# Patient Record
Sex: FEMALE | Race: WHITE | NOT HISPANIC OR LATINO | Employment: FULL TIME | URBAN - METROPOLITAN AREA
[De-identification: names, ages, dates, MRNs, and addresses within clinical notes are randomized per-mention and may not be internally consistent; named-entity substitution may affect disease eponyms.]

---

## 2017-01-09 ENCOUNTER — OFFICE VISIT (OUTPATIENT)
Dept: SLEEP MEDICINE | Facility: CLINIC | Age: 57
End: 2017-01-09
Payer: COMMERCIAL

## 2017-01-09 VITALS
BODY MASS INDEX: 21.62 KG/M2 | SYSTOLIC BLOOD PRESSURE: 140 MMHG | DIASTOLIC BLOOD PRESSURE: 80 MMHG | HEIGHT: 66 IN | HEART RATE: 80 BPM | WEIGHT: 134.5 LBS

## 2017-01-09 DIAGNOSIS — F51.01 PRIMARY INSOMNIA: Primary | ICD-10-CM

## 2017-01-09 PROCEDURE — 1159F MED LIST DOCD IN RCRD: CPT | Mod: S$GLB,,, | Performed by: PSYCHIATRY & NEUROLOGY

## 2017-01-09 PROCEDURE — 3079F DIAST BP 80-89 MM HG: CPT | Mod: S$GLB,,, | Performed by: PSYCHIATRY & NEUROLOGY

## 2017-01-09 PROCEDURE — 99999 PR PBB SHADOW E&M-EST. PATIENT-LVL III: CPT | Mod: PBBFAC,,, | Performed by: PSYCHIATRY & NEUROLOGY

## 2017-01-09 PROCEDURE — 99214 OFFICE O/P EST MOD 30 MIN: CPT | Mod: S$GLB,,, | Performed by: PSYCHIATRY & NEUROLOGY

## 2017-01-09 PROCEDURE — 3077F SYST BP >= 140 MM HG: CPT | Mod: S$GLB,,, | Performed by: PSYCHIATRY & NEUROLOGY

## 2017-01-09 RX ORDER — CLONAZEPAM 0.5 MG/1
0.5 TABLET ORAL NIGHTLY
Qty: 30 TABLET | Refills: 5 | Status: SHIPPED | OUTPATIENT
Start: 2017-01-09 | End: 2017-07-13 | Stop reason: SDUPTHER

## 2017-01-09 RX ORDER — TRAZODONE HYDROCHLORIDE 150 MG/1
TABLET ORAL
Qty: 30 TABLET | Refills: 11 | Status: SHIPPED | OUTPATIENT
Start: 2017-01-09 | End: 2017-07-13 | Stop reason: SDUPTHER

## 2017-01-09 NOTE — PROGRESS NOTES
"Dora Vazquez a  56 y.o. female patient returns for the management of JONATHAN and insomnia ongoing.     She is reporting recurrence of night time awakenings at 3 am    Trazodone 150 mg + clonazepam 0.5 mg po before bedtime, chronically - she repots it is the only thing that has worked. She reports she feels rested after taking. She has cut trazodone to 75 mg for about a month, but feels like she should go back up.    Bedtime at 9 pm;   LO at 10 pm  SOL: 15-20 mins (with meds)  OOB: 6:30-7 am    No longer traveling to [facility].    Trying to make CPAP work for mild sleep apnea. Using it since Fall 2014. Sleeps with it a max of 4 hours. Takes it off because of mask. CPAP is helping the snoring, but she is not sure it is helping her sleep quality.    DME = [facility]    ESS = 2    10/13/14  PSG: Most hypopneas occurred at wake-sleep transition and during REM stage sleep. The overall AHI was 9.0 with an oxygen kevin of 90.0%. The supine AHI was 10.8 and the REM AHI was 26.2. The patient did not qualify for a split night study due to an insufficient number of events in the first half of the study.     SLEEP HISTORY:  Chronic insomnia for 5 years, associated with jas-menopausal symptoms (insomnia was primary complaint, but also experienced "cold flashes").  Menopausal symptoms have been controlled with progesterone/testosterone treatments.  Travels to [facility] every 2 weeks (for last 5 months) for business.  Experiencing severe jetlag symptoms following her travels. (Can only sleep for 10 hours for the first 3-4 days following her trips.)  Valium and clonazepam have been helpful with re-acclimating to new time zone.  She reports having tried multiple other sleep-aid medications with limited success: trazodone, vistaril, ambien, ambien CR, intermezzo  When not traveling, feesl like she is lying there for at least an hour, "can't fall asleep due to not being able to turn brain off".    Sacro-illiac disease contributing to back " "pain.  Prescribed valium for this condition, which incidentally helped her jetlag insomnia remarkably well.  She reports prior sleep apnea study was negative about 5 years ago. Weight unchanged  She denies snoring, gasping for air, no shortness of breath in sleep  Denies RLS, Denies leg kicking at night  Denies alcohol use.  Denies mood disturbance, depression, or anxiety: Prior prozac use following the death of her parents.    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 Mood is normal.      ASSESSMENT:    1. Insomnia NEC with underlying tension state. Difficulty turning off mind/pervasive thoughts. She stopped hormone replacement for menopausal symptoms, and insomnia is improved. Still managing tension/anxiety at bedtime with benzo and trazodone. She was doing better until recently reduced her trazodone.    2. Sleep apnea, mild with symptoms of snoring, night sweats, un-refreshing frequently disturbed sleep. She has been trialing APAP with improvement of snoring, but no real improvement of sleep quality.    PLAN:    1. Continue clonazepam 0.5 mg on nightly basis for tension/anxiety by her PCP, on nights not on Valium - taken for past year; Do not combine with alcohol.  2. ContinueTrazodone 150 mg qhs as prescribed  3. Use APAP 6-16cm with [facility] DME as much as possible     Follow up in 6-12 months. MD  "

## 2017-01-09 NOTE — MR AVS SNAPSHOT
[facility]  [address]  [facility] [address]  Phone: [phone]                  Dora Vazquez   2017 9:00 AM   Office Visit    Description:  Female : 1960   Provider:  Ezequiel Florian MD   Department:  [facility]           Reason for Visit     Sleep Apnea           Diagnoses this Visit        Comments    Primary insomnia    -  Primary            To Do List           Future Appointments        Provider Department Dept Phone    7/10/2017 8:40 AM Ezequiel Florian MD [facility] [phone]      Goals (5 Years of Data)     None      Follow-Up and Disposition     Return in about 6 months (around 2017).    Follow-up and Disposition History       These Medications        Disp Refills Start End    trazodone (DESYREL) 150 MG tablet 30 tablet 11 2017     TAKE 1 TABLET (150 MG TOTAL) BY MOUTH NIGHTLY.    Pharmacy: [facility]/pharmacy #[id number] - [place] [address] Ph #: [phone]       clonazePAM (KLONOPIN) 0.5 MG tablet 30 tablet 5 2017     Take 1 tablet (0.5 mg total) by mouth every evening. - Oral    Pharmacy: [facility]/pharmacy #[id number] - [place] [address] #: [phone]         [facility] On Call     [facility] On Call Nurse Care Line -  Assistance  Registered nurses in the [facility] On Call Center provide clinical advisement, health education, appointment booking, and other advisory services.  Call for this free service at [phone].             Medications           Message regarding Medications     Verify the changes and/or additions to your medication regime listed below are the same as discussed with your clinician today.  If any of these changes or additions are incorrect, please notify your healthcare provider.             Verify that the below list of medications is an accurate representation of the medications you are currently taking.  If none reported, the list may be blank. If incorrect, please  "contact your healthcare provider. Carry this list with you in case of emergency.           Current Medications     clonazePAM (KLONOPIN) 0.5 MG tablet Take 1 tablet (0.5 mg total) by mouth every evening.    trazodone (DESYREL) 150 MG tablet TAKE 1 TABLET (150 MG TOTAL) BY MOUTH NIGHTLY.    aspirin (ECOTRIN) 81 MG EC tablet Take 81 mg by mouth once daily.    cyanocobalamin 1,000 mcg/mL injection     CYANOCOBALAMIN, VITAMIN B-12, (VITAMIN B-12 INJ) Inject 1,000 mcg as directed every 30 days.    tramadol (ULTRAM) 50 mg tablet Take 50 mg by mouth every 6 (six) hours as needed.     vitamin D 185 MG Tab Take 185 mg by mouth once daily.           Clinical Reference Information           Vital Signs - Last Recorded  Most recent update: 1/9/2017  8:56 AM by Betzaida Khan    BP Pulse Ht Wt BMI    (!) 140/80 80 5' 6" (1.676 m) 61 kg (134 lb 8 oz) 21.71 kg/m2      Blood Pressure          Most Recent Value    BP  (!)  140/80      Allergies as of 1/9/2017     Codeine      Immunizations Administered on Date of Encounter - 1/9/2017     None      "

## 2017-07-13 ENCOUNTER — OFFICE VISIT (OUTPATIENT)
Dept: SLEEP MEDICINE | Facility: CLINIC | Age: 57
End: 2017-07-13
Payer: COMMERCIAL

## 2017-07-13 VITALS
BODY MASS INDEX: 21.01 KG/M2 | SYSTOLIC BLOOD PRESSURE: 152 MMHG | HEART RATE: 85 BPM | DIASTOLIC BLOOD PRESSURE: 98 MMHG | HEIGHT: 66 IN | WEIGHT: 130.75 LBS

## 2017-07-13 DIAGNOSIS — F51.01 PRIMARY INSOMNIA: ICD-10-CM

## 2017-07-13 DIAGNOSIS — G47.33 OBSTRUCTIVE SLEEP APNEA: Primary | ICD-10-CM

## 2017-07-13 PROCEDURE — 99214 OFFICE O/P EST MOD 30 MIN: CPT | Mod: S$GLB,,, | Performed by: NURSE PRACTITIONER

## 2017-07-13 PROCEDURE — 99999 PR PBB SHADOW E&M-EST. PATIENT-LVL III: CPT | Mod: PBBFAC,,, | Performed by: NURSE PRACTITIONER

## 2017-07-13 RX ORDER — CLONAZEPAM 0.5 MG/1
0.5 TABLET ORAL NIGHTLY
Qty: 30 TABLET | Refills: 5 | Status: SHIPPED | OUTPATIENT
Start: 2017-07-13 | End: 2018-01-03 | Stop reason: SDUPTHER

## 2017-07-13 RX ORDER — TRAZODONE HYDROCHLORIDE 150 MG/1
TABLET ORAL
Qty: 30 TABLET | Refills: 11 | Status: SHIPPED | OUTPATIENT
Start: 2017-07-13 | End: 2018-03-05 | Stop reason: SDUPTHER

## 2017-07-13 NOTE — PROGRESS NOTES
"Dora Vazquez a  56 y.o. female patient returns for the management of JONATHAN and insomnia, last seen by MD 1/9/17, this is my initial visit with her.     Since last seen, she is still not using apap. Never had adherence monitoring after setup '14. Found using it disrupted her sleep more. With taking trazadone 150mg 1/2 tab and klonopin 0.5mg qhs she is able to sleep undisturbed. Denies am hangover. ESS=1. Very high work stress and memory loss issues, affecting work so much that she thinks she might lose her job. Held responsible for things out of her control at times ([employer]). Sleeping 10:30-6am. Feels exhausted and fatigued.       1/9/17  She is reporting recurrence of night time awakenings at 3 am    Trazodone 150 mg + clonazepam 0.5 mg po before bedtime, chronically - she repots it is the only thing that has worked. She reports she feels rested after taking. She has cut trazodone to 75 mg for about a month, but feels like she should go back up.    Bedtime at 9 pm;   LO at 10 pm  SOL: 15-20 mins (with meds)  OOB: 6:30-7 am    No longer traveling to [facility].    Trying to make CPAP work for mild sleep apnea. Using it since Fall 2014. Sleeps with it a max of 4 hours. Takes it off because of mask. CPAP is helping the snoring, but she is not sure it is helping her sleep quality.    DME = [facility]    ESS = 2    10/13/14  PSG: Most hypopneas occurred at wake-sleep transition and during REM stage sleep. The overall AHI was 9.0 with an oxygen kevin of 90.0%. The supine AHI was 10.8 and the REM AHI was 26.2. The patient did not qualify for a split night study due to an insufficient number of events in the first half of the study.     SLEEP HISTORY:  Chronic insomnia for 5 years, associated with jas-menopausal symptoms (insomnia was primary complaint, but also experienced "cold flashes").  Menopausal symptoms have been controlled with progesterone/testosterone treatments.  Travels to [facility] every 2 weeks (for last 5 " "months) for business.  Experiencing severe jetlag symptoms following her travels. (Can only sleep for 10 hours for the first 3-4 days following her trips.)  Valium and clonazepam have been helpful with re-acclimating to new time zone.  She reports having tried multiple other sleep-aid medications with limited success: trazodone, vistaril, ambien, ambien CR, intermezzo  When not traveling, feesl like she is lying there for at least an hour, "can't fall asleep due to not being able to turn brain off".    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  Prescribed valium for this condition, which incidentally helped her jetlag insomnia remarkably well.  She reports prior sleep apnea study was negative about 5 years ago. Weight unchanged  She denies snoring, gasping for air, no shortness of breath in sleep  Denies RLS, Denies leg kicking at night  Denies alcohol use.  Denies mood disturbance, depression, or anxiety: Prior prozac use following the death of her parents.    Past Medical History:   Diagnosis Date    Arthritis     COPD (chronic obstructive pulmonary disease)     Depression     GERD (gastroesophageal reflux disease)     History of colonic polyps     Hypertension     Hypothyroidism     Insomnia     Ulcer        REVIEW OF SYSTEMS: 4# gain, work stress, otherwise a balance review of 10-systems is negative.    PHYSICAL EXAM:  BP (!) 152/98   Pulse 85   Ht 5' 6" (1.676 m)   Wt 59.3 kg (130 lb 11.7 oz)   BMI 21.10 kg/m²   GENERAL: Well groomed; Normally developed;  PSYCH: Affect is normal.  Mood is sad/tearful talking about her memory.      ASSESSMENT:    1. Insomnia NEC with underlying tension state. Difficulty turning off mind/pervasive thoughts. She stopped hormone replacement for menopausal symptoms, and insomnia is improved. Still managing tension/anxiety at bedtime with benzo and trazodone. She was doing better until recently reduced her trazodone. 7/13/17: Sleep onset and maintenance stable on " current dose medications.     2. Sleep apnea, mild, worse REM (AHI 23) .  She has been trialing APAP with improvement of snoring, but no real improvement of sleep quality. 7/13/17:Willing to consider resuming use of APAP 6-16cm with alternative FFM and adherence monitoring (potential factor affecting memory/stress mgt). Perceived memory loss.     PLAN:    1. Continue clonazepam 0.5 mg on nightly basis for tension/anxiety (on nights not on Valium, pcp) Do not combine with alcohol. Refilled  2. ContinueTrazodone 150 mg 1/2 tab qhs as prescribed, refilled  3. Resume APAP 6-16cm with [facility] DME as much as possible, Yan[place] View mask consider, avoid mask leaks. Consider mask liner prn.   4. RTC 7wks (out of town all Aug), adherence

## 2017-08-10 ENCOUNTER — TELEPHONE (OUTPATIENT)
Dept: CARDIOLOGY | Facility: CLINIC | Age: 57
End: 2017-08-10

## 2017-08-10 DIAGNOSIS — R00.2 PALPITATION: Primary | ICD-10-CM

## 2017-08-10 NOTE — TELEPHONE ENCOUNTER
----- Message from Carla Ruggiero sent at 8/10/2017  1:02 PM CDT -----  Contact: pt called  Please add an EKG order. Thank you

## 2017-08-14 ENCOUNTER — OFFICE VISIT (OUTPATIENT)
Dept: CARDIOLOGY | Facility: CLINIC | Age: 57
End: 2017-08-14
Payer: COMMERCIAL

## 2017-08-14 ENCOUNTER — HOSPITAL ENCOUNTER (OUTPATIENT)
Dept: CARDIOLOGY | Facility: CLINIC | Age: 57
Discharge: HOME | End: 2017-08-14
Payer: COMMERCIAL

## 2017-08-14 VITALS
HEIGHT: 65 IN | HEART RATE: 66 BPM | WEIGHT: 129 LBS | SYSTOLIC BLOOD PRESSURE: 140 MMHG | BODY MASS INDEX: 21.49 KG/M2 | DIASTOLIC BLOOD PRESSURE: 78 MMHG

## 2017-08-14 DIAGNOSIS — Z98.84 S/P GASTRIC BYPASS: ICD-10-CM

## 2017-08-14 DIAGNOSIS — R06.02 SOB (SHORTNESS OF BREATH): ICD-10-CM

## 2017-08-14 DIAGNOSIS — D69.6 THROMBOCYTOPENIA: Primary | ICD-10-CM

## 2017-08-14 DIAGNOSIS — R07.89 CHEST PRESSURE: ICD-10-CM

## 2017-08-14 DIAGNOSIS — R00.2 PALPITATION: ICD-10-CM

## 2017-08-14 PROCEDURE — 3008F BODY MASS INDEX DOCD: CPT | Mod: S$GLB,,, | Performed by: INTERNAL MEDICINE

## 2017-08-14 PROCEDURE — 93000 ELECTROCARDIOGRAM COMPLETE: CPT | Mod: S$GLB,,, | Performed by: INTERNAL MEDICINE

## 2017-08-14 PROCEDURE — 3077F SYST BP >= 140 MM HG: CPT | Mod: S$GLB,,, | Performed by: INTERNAL MEDICINE

## 2017-08-14 PROCEDURE — 99999 PR PBB SHADOW E&M-EST. PATIENT-LVL III: CPT | Mod: PBBFAC,,, | Performed by: INTERNAL MEDICINE

## 2017-08-14 PROCEDURE — 3078F DIAST BP <80 MM HG: CPT | Mod: S$GLB,,, | Performed by: INTERNAL MEDICINE

## 2017-08-14 PROCEDURE — 99204 OFFICE O/P NEW MOD 45 MIN: CPT | Mod: S$GLB,,, | Performed by: INTERNAL MEDICINE

## 2017-08-14 RX ORDER — SIMVASTATIN 20 MG/1
20 TABLET, FILM COATED ORAL NIGHTLY
Qty: 90 TABLET | Refills: 3 | Status: SHIPPED | OUTPATIENT
Start: 2017-08-14 | End: 2018-03-15

## 2017-08-14 RX ORDER — AMLODIPINE BESYLATE 10 MG/1
10 TABLET ORAL DAILY
Qty: 45 TABLET | Refills: 3 | Status: SHIPPED | OUTPATIENT
Start: 2017-08-14 | End: 2017-08-21

## 2017-08-14 NOTE — PROGRESS NOTES
"Subjective:   Patient ID:  Dora Vazquez is a 56 y.o. female is a new patient who presents for evaluation of Coronary Artery Disease  HPI:   Patient underwent removal of nodules   Patient is referred for coronary calcification on CT chest.  Ex smoker 50 pack years- patient still smokes occasionally.   Strong f/h   Mother had MI at age 63 when she was 63.   Patient did have an angiogram in 2000 that was negative- for chest pain.   Patient has a lap band and has lost 125 pounds (2007)- in [place].  Patient does not check her BP at home.    EKG: NSR, no ST-T changes.    Patient Active Problem List   Diagnosis    Obesity    Spondylosis without myelopathy    Degeneration of lumbar or lumbosacral intervertebral disc    Spinal stenosis, lumbar region, with neurogenic claudication    Thoracic or lumbosacral neuritis or radiculitis, unspecified    Displacement of lumbar intervertebral disc without myelopathy    Lumbago    Sacroiliitis    GERD (gastroesophageal reflux disease)    Obstructive sleep apnea (adult) (pediatric)     BP (!) 140/78   Pulse 66   Ht 5' 5" (1.651 m)   Wt 58.5 kg (128 lb 15.5 oz)   BMI 21.46 kg/m²   Body mass index is 21.46 kg/m².  CrCl cannot be calculated (Patient's most recent sCr result is older than the maximum 7 days allowed.).    Lab Results   Component Value Date     06/04/2004    K 4.7 06/04/2004     06/04/2004    CO2 27 06/04/2004    BUN 10 06/04/2004    CREATININE 0.7 06/04/2004    GLU 93 06/04/2004    WBC 8.07 06/04/2004    HGB 14.1 06/04/2004    HCT 43.9 06/04/2004    MCV 89.4 06/04/2004     06/04/2004    CHOL 211 (H) 06/04/2004    HDL 53.0 06/04/2004    LDLCALC 129.0 06/04/2004    TRIG 145 06/04/2004       Current Outpatient Prescriptions   Medication Sig    aspirin (ECOTRIN) 81 MG EC tablet Take 81 mg by mouth once daily.    clonazePAM (KLONOPIN) 0.5 MG tablet Take 1 tablet (0.5 mg total) by mouth every evening.    cyanocobalamin 1,000 mcg/mL " injection     CYANOCOBALAMIN, VITAMIN B-12, (VITAMIN B-12 INJ) Inject 1,000 mcg as directed every 30 days.    trazodone (DESYREL) 150 MG tablet TAKE 1 TABLET (150 MG TOTAL) BY MOUTH NIGHTLY.    vitamin D 185 MG Tab Take 185 mg by mouth once daily.    amlodipine (NORVASC) 10 MG tablet Take 1 tablet (10 mg total) by mouth once daily. Take 0.5 pill/day    simvastatin (ZOCOR) 20 MG tablet Take 1 tablet (20 mg total) by mouth every evening.     No current facility-administered medications for this visit.        Review of Systems   Constitution: Positive for malaise/fatigue and weight loss. Negative for chills, decreased appetite, weakness, night sweats and weight gain.   Eyes: Negative for blurred vision, double vision, visual disturbance and visual halos.   Cardiovascular: Negative for chest pain, claudication, cyanosis, dyspnea on exertion, irregular heartbeat, leg swelling, near-syncope, orthopnea, palpitations, paroxysmal nocturnal dyspnea and syncope.   Respiratory: Negative for cough, hemoptysis, snoring, sputum production and wheezing.    Endocrine: Negative for cold intolerance, heat intolerance, polydipsia and polyphagia.   Hematologic/Lymphatic: Negative for adenopathy and bleeding problem. Does not bruise/bleed easily.   Skin: Negative for flushing, itching, poor wound healing and rash.   Musculoskeletal: Negative for arthritis, back pain, falls, gout, joint pain, joint swelling, muscle cramps, muscle weakness, myalgias, neck pain and stiffness.   Gastrointestinal: Negative for bloating, abdominal pain, anorexia, diarrhea, dysphagia, excessive appetite, flatus, hematemesis, jaundice, melena and nausea.   Genitourinary: Negative for hesitancy and incomplete emptying.   Neurological: Negative for aphonia, brief paralysis, difficulty with concentration, disturbances in coordination, excessive daytime sleepiness, dizziness, focal weakness, light-headedness and loss of balance.        Memory loss    Psychiatric/Behavioral: Negative for altered mental status, depression, hallucinations, hypervigilance, memory loss, substance abuse and suicidal ideas. The patient does not have insomnia and is not nervous/anxious.        Objective:   Physical Exam   Constitutional: She is oriented to person, place, and time. She appears well-developed and well-nourished. No distress.   Patient looks well   HENT:   Head: Normocephalic and atraumatic.   Nose: Nose normal.   Mouth/Throat: Oropharynx is clear and moist. No oropharyngeal exudate.   Eyes: Conjunctivae and EOM are normal. Pupils are equal, round, and reactive to light. Right eye exhibits no discharge. Left eye exhibits no discharge. No scleral icterus.   Neck: Normal range of motion. Neck supple. No JVD present. No tracheal deviation present. No thyromegaly present.   Cardiovascular: Normal rate, regular rhythm, normal heart sounds and intact distal pulses.  Exam reveals no gallop and no friction rub.    No murmur heard.  Pulmonary/Chest: Effort normal and breath sounds normal. No stridor. No respiratory distress. She has no wheezes. She has no rales. She exhibits no tenderness.   Abdominal: Soft. Bowel sounds are normal. She exhibits no distension and no mass. There is no tenderness. There is no rebound and no guarding.   Musculoskeletal: Normal range of motion. She exhibits no edema or tenderness.   Lymphadenopathy:     She has no cervical adenopathy.   Neurological: She is alert and oriented to person, place, and time. She has normal reflexes. No cranial nerve deficit. She exhibits normal muscle tone. Coordination normal.   Skin: Skin is warm. No rash noted. She is not diaphoretic. No erythema. No pallor.   Psychiatric: She has a normal mood and affect. Her behavior is normal. Judgment and thought content normal.       Assessment:     1. Thrombocytopenia    2. Chest pressure    3. SOB (shortness of breath)    4. S/P gastric bypass        Plan:   Dora was seen  today for coronary artery disease.    Diagnoses and all orders for this visit:    Thrombocytopenia  -     Ambulatory Referral to Hematology / Oncology    Chest pressure  -     Nuclear Stress Test - Pharmacologic - Interpreted by Cardiology ([facility]); Future    SOB (shortness of breath)  -     Nuclear Stress Test - Pharmacologic - Interpreted by Cardiology ([facility]); Future    S/P gastric bypass    Other orders  -     simvastatin (ZOCOR) 20 MG tablet; Take 1 tablet (20 mg total) by mouth every evening.  -     amlodipine (NORVASC) 10 MG tablet; Take 1 tablet (10 mg total) by mouth once daily. Take 0.5 pill/day      I put in a referral to hematology/oncology due to weight loss and new thrombocytopenia- this is concerning given she has a strong family history of malignancy.   Her sx may be all pulmonary but given her Coronary calcium, smoking history, family history and age, reasonable to pursue a stress test.   Counseled on importance of heart healthy diet low in saturated and trans fat and salt as well gradually starting a regular aerobic exercise regimen with goal of 30min 5x/week. Recommend BP diary. Call if systolic BP > 140 mmHg on checking repeatedly  All meds reviewed and are appropriate  Patient is compliant with her medications.      RTC 5 months for follow up.

## 2017-08-16 ENCOUNTER — TELEPHONE (OUTPATIENT)
Dept: CARDIOLOGY | Facility: CLINIC | Age: 57
End: 2017-08-16

## 2017-08-16 NOTE — TELEPHONE ENCOUNTER
----- Message from Jessie Peres RN sent at 8/14/2017  9:38 AM CDT -----  Dr. Clark staff,    Good morning.  I rec'd a call from [facility] to schedule hematology consult for above listed patient r/t thrombocytopenia.  However, there are no recent labs in the system or under media tab to indicate this.  If she had outside labs could you please fax them to me at [phone] in order to arrange this consult in a timely manner.    Any questions please call [phone].    Thanks,  Jessie Peres, HEIDI

## 2017-08-18 ENCOUNTER — PATIENT MESSAGE (OUTPATIENT)
Dept: CARDIOLOGY | Facility: CLINIC | Age: 57
End: 2017-08-18

## 2017-08-18 ENCOUNTER — TELEPHONE (OUTPATIENT)
Dept: HEMATOLOGY/ONCOLOGY | Facility: CLINIC | Age: 57
End: 2017-08-18

## 2017-08-18 NOTE — TELEPHONE ENCOUNTER
Spoke w/ the pt. explaining records were requested from cards on 08/14 to schedule consult. It is noted pt. has consult w/ Dr. Funes on Thurs, 08/31 at 0900. Pt. unaware of this appt. Will request labs from ; pt. aware if records not rec'd appt. will be rescheduled.

## 2017-08-21 RX ORDER — AMLODIPINE BESYLATE 5 MG/1
5 TABLET ORAL DAILY
Qty: 90 TABLET | Refills: 3 | Status: SHIPPED | OUTPATIENT
Start: 2017-08-21 | End: 2017-08-31

## 2017-08-31 ENCOUNTER — INITIAL CONSULT (OUTPATIENT)
Dept: HEMATOLOGY/ONCOLOGY | Facility: CLINIC | Age: 57
End: 2017-08-31
Payer: COMMERCIAL

## 2017-08-31 ENCOUNTER — LAB VISIT (OUTPATIENT)
Dept: LAB | Facility: HOSPITAL | Age: 57
End: 2017-08-31
Attending: INTERNAL MEDICINE
Payer: COMMERCIAL

## 2017-08-31 VITALS
SYSTOLIC BLOOD PRESSURE: 134 MMHG | BODY MASS INDEX: 20.37 KG/M2 | DIASTOLIC BLOOD PRESSURE: 78 MMHG | WEIGHT: 126.75 LBS | HEART RATE: 76 BPM | HEIGHT: 66 IN

## 2017-08-31 DIAGNOSIS — D69.6 THROMBOCYTOPENIA: ICD-10-CM

## 2017-08-31 LAB
ALBUMIN SERPL BCP-MCNC: 3.9 G/DL
ALP SERPL-CCNC: 81 U/L
ALT SERPL W/O P-5'-P-CCNC: 10 U/L
ANION GAP SERPL CALC-SCNC: 9 MMOL/L
AST SERPL-CCNC: 18 U/L
BASOPHILS # BLD AUTO: 0.1 K/UL
BASOPHILS NFR BLD: 1.8 %
BILIRUB SERPL-MCNC: 0.7 MG/DL
BUN SERPL-MCNC: 13 MG/DL
CALCIUM SERPL-MCNC: 10 MG/DL
CHLORIDE SERPL-SCNC: 102 MMOL/L
CO2 SERPL-SCNC: 30 MMOL/L
CREAT SERPL-MCNC: 0.7 MG/DL
DIFFERENTIAL METHOD: NORMAL
EOSINOPHIL # BLD AUTO: 0.1 K/UL
EOSINOPHIL NFR BLD: 2.3 %
ERYTHROCYTE [DISTWIDTH] IN BLOOD BY AUTOMATED COUNT: 13.1 %
EST. GFR  (AFRICAN AMERICAN): >60 ML/MIN/1.73 M^2
EST. GFR  (NON AFRICAN AMERICAN): >60 ML/MIN/1.73 M^2
GLUCOSE SERPL-MCNC: 93 MG/DL
HCT VFR BLD AUTO: 41.1 %
HGB BLD-MCNC: 13.9 G/DL
HIV 1+2 AB+HIV1 P24 AG SERPL QL IA: NEGATIVE
LYMPHOCYTES # BLD AUTO: 1.7 K/UL
LYMPHOCYTES NFR BLD: 29.4 %
MCH RBC QN AUTO: 29.7 PG
MCHC RBC AUTO-ENTMCNC: 33.8 G/DL
MCV RBC AUTO: 88 FL
MONOCYTES # BLD AUTO: 0.4 K/UL
MONOCYTES NFR BLD: 7.3 %
NEUTROPHILS # BLD AUTO: 3.3 K/UL
NEUTROPHILS NFR BLD: 59 %
PLATELET # BLD AUTO: 163 K/UL
PMV BLD AUTO: 11 FL
POTASSIUM SERPL-SCNC: 3.9 MMOL/L
PROT SERPL-MCNC: 7.2 G/DL
RBC # BLD AUTO: 4.68 M/UL
SODIUM SERPL-SCNC: 141 MMOL/L
TSH SERPL DL<=0.005 MIU/L-ACNC: 1.43 UIU/ML
WBC # BLD AUTO: 5.65 K/UL

## 2017-08-31 PROCEDURE — 86803 HEPATITIS C AB TEST: CPT

## 2017-08-31 PROCEDURE — 85025 COMPLETE CBC W/AUTO DIFF WBC: CPT

## 2017-08-31 PROCEDURE — 86038 ANTINUCLEAR ANTIBODIES: CPT

## 2017-08-31 PROCEDURE — 86677 HELICOBACTER PYLORI ANTIBODY: CPT

## 2017-08-31 PROCEDURE — 99999 PR PBB SHADOW E&M-EST. PATIENT-LVL III: CPT | Mod: PBBFAC,,, | Performed by: INTERNAL MEDICINE

## 2017-08-31 PROCEDURE — 36415 COLL VENOUS BLD VENIPUNCTURE: CPT

## 2017-08-31 PROCEDURE — 86703 HIV-1/HIV-2 1 RESULT ANTBDY: CPT

## 2017-08-31 PROCEDURE — 80053 COMPREHEN METABOLIC PANEL: CPT

## 2017-08-31 PROCEDURE — 83516 IMMUNOASSAY NONANTIBODY: CPT | Mod: 59

## 2017-08-31 PROCEDURE — 99244 OFF/OP CNSLTJ NEW/EST MOD 40: CPT | Mod: S$GLB,,, | Performed by: INTERNAL MEDICINE

## 2017-08-31 PROCEDURE — 84443 ASSAY THYROID STIM HORMONE: CPT

## 2017-08-31 RX ORDER — LANOLIN ALCOHOL/MO/W.PET/CERES
100 CREAM (GRAM) TOPICAL DAILY
COMMUNITY

## 2017-08-31 NOTE — LETTER
August 31, 2017      Velvet Clark MD  [address] [facility]  8th [address]  [place] [address]           [facility]  [address]  [place] [address]  Phone: [phone]          Patient: Dora Vazquez   MR Number: [id number]   YOB: 1960   Date of Visit: 8/31/2017       Dear Dr. Velvet Clark:    Thank you for referring Dora Vazquez to me for evaluation. Attached you will find relevant portions of my assessment and plan of care.    If you have questions, please do not hesitate to call me. I look forward to following Dora Vazquez along with you.    Sincerely,    Jaime Funes Jr., MD    Enclosure  CC:  No Recipients    If you would like to receive this communication electronically, please contact [email or web address] or [phone] to request more information on [employer] Link access.    For providers and/or their staff who would like to refer a patient to [facility], please contact us through our one-stop-shop provider referral line, [facility], at [phone].    If you feel you have received this communication in error or would no longer like to receive these types of communications, please e-mail [email or web address]

## 2017-08-31 NOTE — PROGRESS NOTES
HISTORY OF PRESENT ILLNESS:  Ms. Vazquez is a 56-year-old woman who is seen in   consultation from Dr. Velvet Clark.  She is here in regard to   thrombocytopenia.  She has also lost weight in the last four years.    She has had no spontaneous bleeding except for noting one very transient episode   of blood in the urine or the urethra.    She has no history of peptic ulcer disease.  She has not been told of any   thyroid disease.  She recalls a physician about one year ago telling her that   her platelets were decreased.  She has not had hepatitis nor does she have risk   factors for HIV infection.  She does not report symptoms suggestive of lupus.    She had a Lap-Band procedure in 2007, following which she lost 125 pounds over   eight months.  In 2013,  the Lap-Band was readjusted.  Between October 2013 and   September 2014, her weight declined 40 pounds from 173 to 133 pounds.  Since   September 2014, her weight has been between 127 and 136 pounds.  She reports   that she is eating as much she did prior to the Lap-Band procedure.  She   occasionally has watery diarrhea and this has been present for many years.  She   last had a colonoscopy about 18 months ago.  With prior colonoscopies, polyps   had been removed.    She reports that for the last six months, she has been more fatigued than in the   past.  She is particularly concerned that she is becoming more forgetful.  She   does not think that she is depressed.  She has chronic insomnia.  She is not   having fevers or sweats.  She remains very active at work.    She has mild hypertension and evidence of obstructive lung disease.  In 2013,   she had surgical excision of three benign nodules in her right lung.  This year,   she had two surgical procedures on her left wrist for problems with   tendons in that location.  There was no obvious trauma associated with this   problem, but it was speculated that lifting a heavy object may have damaged a   tendon in  her wrist.  She had several breast biopsies and the lesions   removed have been benign.  She had a cholecystectomy.    SOCIAL HISTORY:  She smoked about one pack of cigarettes daily until 2013 and   she is currently smoking about five cigarettes a day.  She is drinking very   little alcohol.  She is  and has no children.  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  She has traveled extensively out of the [place]   including [place], [place], [place], [place], [place] and [place].    FAMILY HISTORY:  Her father had acute leukemia.  Her paternal grandfather had   some variety of leukemia.  Her mother had a brain tumor.  Her sister had a   low-stage ovarian cancer and apparently has been cured with treatment.  The   sister's cancer was discovered in her 30s.  No genetic studies on the sister or   her family members have been carried out.  A maternal uncle had Hodgkin   lymphoma.    ADDITIONAL PAST HISTORY, SYSTEM REVIEW, SOCIAL HISTORY AND FAMILY HISTORY:  Have   been reviewed and updated in the electronic record.    PHYSICAL EXAMINATION:  GENERAL APPEARANCE:  A well-developed and well-nourished female, in no distress.  HEENT:   Eyes, no jaundice or pallor.  Ears, clear canals and membranes.  Nose,   clear nares.  Sinuses:  No tenderness.  Mouth and throat:  Good dentition.  No   mucosal lesions.  NECK:  No masses or bruits.  No thyroid abnormalities.  LYMPH NODES:  No enlarged cervical, axillary or inguinal nodes.  CHEST AND LUNGS:  Normal respiratory effort.  Clear to auscultation and   percussion.  HEART:  Regular rate and rhythm without murmur or gallop.  ABDOMEN:  Soft without masses or tenderness.  No hepatosplenomegaly.  EXTREMITIES:  No edema or cyanosis.  SKIN:  There are surgical scars on the left wrist.  SKIN:  No suspicious lesions in the areas examined.  A tattoo on the right lower   back.  NEUROLOGIC:  Motor function is good.  She is fully oriented.  Mental status   seems  normal.    LABORATORY STUDIES:  A blood count performed on 07/28/2017, included hemoglobin   13.5, WBC 6400 and platelets are 130,000.  A B12 level was normal at 355.    Comprehensive metabolic profile was normal with the exception of calcium 10.5.    Computed tomography examination of the chest in August 2017 showed mild   emphysematous changes along with some scarring in the right lung and a 2-mm   pleural based nodule in the posterior right upper lobe.    IMPRESSION:  1.  Mild thrombocytopenia.  2.  Tobacco use disorder.  3.  Radiographic evidence of emphysema.  4.  Prior Lap-Band procedure.    RECOMMENDATIONS:  1.  To further evaluate the thrombocytopenia, blood will be obtained today for   the following studies:  CBC with peripheral smear, TYLER, celiac panel, CMP, H.   pylori antibody, hepatitis C antibody, HIV antibody and TSH.  2.  If the studies obtained today do not detect any disorder other than the   modest reduction in platelets, I will advise her to have her CBC monitored at   six-month intervals.  She may have mild immune thrombocytopenia, which is a   diagnosis of exclusion.  As long as the other blood counts are normal,    I do not recommend bone marrow examinations in patients with   thrombocytopenia unless the platelet count falls below 100,000.  3.  I have given her the name of a neurologist whom she may wish to see in   regard to her forgetfulness and I have given her a list of Internal Medicine   specialists since she is interested in seeing a primary care physician at   [facility].    ADDENDUM: The CBC is normal today, including platelets 163,000.      ERIKA  dd: 08/31/2017 09:46:00 (CDT)  td: 08/31/2017 23:37:27 (CDT)  Doc ID   #[id number]  Job ID #[id number]    CC: Velvet Clark M.D.

## 2017-09-01 ENCOUNTER — TELEPHONE (OUTPATIENT)
Dept: HEMATOLOGY/ONCOLOGY | Facility: CLINIC | Age: 57
End: 2017-09-01

## 2017-09-01 LAB
ANA SER QL IF: NORMAL
GLIADIN PEPTIDE IGA SER-ACNC: 7 UNITS
GLIADIN PEPTIDE IGG SER-ACNC: 4 UNITS
HCV AB SERPL QL IA: NEGATIVE
IGA SERPL-MCNC: 88 MG/DL
TTG IGA SER IA-ACNC: 3 UNITS
TTG IGG SER IA-ACNC: 3 UNITS

## 2017-09-01 NOTE — TELEPHONE ENCOUNTER
Platelet count normal, as are most of the other studies I ordered.  She should have her cbc monitored every 6-12 months by her pcp.  She can be referred back to Hematology if a major change occurs.

## 2017-09-05 LAB — H PYLORI IGG SERPL QL IA: NEGATIVE

## 2017-09-07 ENCOUNTER — PATIENT MESSAGE (OUTPATIENT)
Dept: CARDIOLOGY | Facility: CLINIC | Age: 57
End: 2017-09-07

## 2017-09-08 ENCOUNTER — TELEPHONE (OUTPATIENT)
Dept: CARDIOLOGY | Facility: CLINIC | Age: 57
End: 2017-09-08

## 2017-09-08 RX ORDER — HYDROCHLOROTHIAZIDE 25 MG/1
25 TABLET ORAL DAILY
Qty: 90 TABLET | Refills: 3 | Status: SHIPPED | OUTPATIENT
Start: 2017-09-08 | End: 2018-09-17 | Stop reason: SDUPTHER

## 2017-09-15 ENCOUNTER — CLINICAL SUPPORT (OUTPATIENT)
Dept: CARDIOLOGY | Facility: CLINIC | Age: 57
End: 2017-09-15
Payer: COMMERCIAL

## 2017-09-15 DIAGNOSIS — R06.02 SOB (SHORTNESS OF BREATH): ICD-10-CM

## 2017-09-15 DIAGNOSIS — R07.89 CHEST PRESSURE: ICD-10-CM

## 2017-09-15 PROCEDURE — 78452 HT MUSCLE IMAGE SPECT MULT: CPT | Mod: S$GLB,,, | Performed by: INTERNAL MEDICINE

## 2017-09-15 PROCEDURE — A9502 TC99M TETROFOSMIN: HCPCS | Mod: S$GLB,,, | Performed by: INTERNAL MEDICINE

## 2017-09-15 PROCEDURE — 93015 CV STRESS TEST SUPVJ I&R: CPT | Mod: S$GLB,,, | Performed by: INTERNAL MEDICINE

## 2017-09-18 ENCOUNTER — PATIENT MESSAGE (OUTPATIENT)
Dept: CARDIOLOGY | Facility: CLINIC | Age: 57
End: 2017-09-18

## 2017-09-18 ENCOUNTER — TELEPHONE (OUTPATIENT)
Dept: CARDIOLOGY | Facility: CLINIC | Age: 57
End: 2017-09-18

## 2017-09-18 NOTE — TELEPHONE ENCOUNTER
----- Message from Carla Ruggiero sent at 9/18/2017 11:00 AM CDT -----  Contact: pt called  Pt called, states she is returning your call and will like to be reached at ph [phone].She states she is boarding a flight and will like you to leave a message width details. Pt of Dr. Clark and LOV 8/14/17. Thank you

## 2017-09-18 NOTE — TELEPHONE ENCOUNTER
----- Message from Velvet Clark MD sent at 9/17/2017  9:53 PM CDT -----  Please let pt. Know that the stress test was normal

## 2017-11-29 ENCOUNTER — TELEPHONE (OUTPATIENT)
Dept: NEUROLOGY | Facility: CLINIC | Age: 57
End: 2017-11-29

## 2017-12-10 ENCOUNTER — OFFICE VISIT (OUTPATIENT)
Dept: URGENT CARE | Facility: CLINIC | Age: 57
End: 2017-12-10
Payer: COMMERCIAL

## 2017-12-10 VITALS
HEIGHT: 65 IN | HEART RATE: 92 BPM | OXYGEN SATURATION: 98 % | RESPIRATION RATE: 16 BRPM | SYSTOLIC BLOOD PRESSURE: 153 MMHG | TEMPERATURE: 99 F | WEIGHT: 125 LBS | DIASTOLIC BLOOD PRESSURE: 94 MMHG | BODY MASS INDEX: 20.83 KG/M2

## 2017-12-10 DIAGNOSIS — J40 BRONCHITIS: Primary | ICD-10-CM

## 2017-12-10 PROCEDURE — 96372 THER/PROPH/DIAG INJ SC/IM: CPT | Mod: S$GLB,,, | Performed by: FAMILY MEDICINE

## 2017-12-10 PROCEDURE — 99214 OFFICE O/P EST MOD 30 MIN: CPT | Mod: 25,S$GLB,, | Performed by: FAMILY MEDICINE

## 2017-12-10 RX ORDER — AMOXICILLIN AND CLAVULANATE POTASSIUM 600; 42.9 MG/5ML; MG/5ML
POWDER, FOR SUSPENSION ORAL
Qty: 140 ML | Refills: 0 | Status: SHIPPED | OUTPATIENT
Start: 2017-12-10 | End: 2017-12-20

## 2017-12-10 RX ORDER — ALBUTEROL SULFATE 90 UG/1
2 AEROSOL, METERED RESPIRATORY (INHALATION) EVERY 4 HOURS PRN
Qty: 1 INHALER | Refills: 0 | Status: SHIPPED | OUTPATIENT
Start: 2017-12-10 | End: 2020-08-28

## 2017-12-10 RX ORDER — BENZONATATE 200 MG/1
200 CAPSULE ORAL 3 TIMES DAILY PRN
Qty: 30 CAPSULE | Refills: 0 | Status: SHIPPED | OUTPATIENT
Start: 2017-12-10 | End: 2017-12-20

## 2017-12-10 RX ORDER — BETAMETHASONE SODIUM PHOSPHATE AND BETAMETHASONE ACETATE 3; 3 MG/ML; MG/ML
9 INJECTION, SUSPENSION INTRA-ARTICULAR; INTRALESIONAL; INTRAMUSCULAR; SOFT TISSUE
Status: COMPLETED | OUTPATIENT
Start: 2017-12-10 | End: 2017-12-10

## 2017-12-10 RX ADMIN — BETAMETHASONE SODIUM PHOSPHATE AND BETAMETHASONE ACETATE 9 MG: 3; 3 INJECTION, SUSPENSION INTRA-ARTICULAR; INTRALESIONAL; INTRAMUSCULAR; SOFT TISSUE at 06:12

## 2017-12-11 NOTE — PROGRESS NOTES
"Subjective:       Patient ID: Dora Vazquez is a 57 y.o. female.    Vitals:  height is 5' 5" (1.651 m) and weight is 56.7 kg (125 lb). Her temperature is 98.6 °F (37 °C). Her blood pressure is 153/94 (abnormal) and her pulse is 92. Her respiration is 16 and oxygen saturation is 98%.     Chief Complaint: URI (pt said she has been sick for a week)    Pt said she took one bactrim yesterday and one tonight      URI    This is a new problem. The current episode started in the past 7 days. The problem has been unchanged. There has been no fever. Associated symptoms include congestion, coughing and headaches. Pertinent negatives include no abdominal pain, chest pain, ear pain, nausea, sore throat or wheezing. She has tried decongestant and NSAIDs for the symptoms. The treatment provided no relief.     Review of Systems   Constitution: Positive for malaise/fatigue. Negative for chills and fever.   HENT: Positive for congestion. Negative for ear pain, hoarse voice and sore throat.    Eyes: Negative for discharge and redness.   Cardiovascular: Negative for chest pain, dyspnea on exertion and leg swelling.   Respiratory: Positive for cough. Negative for shortness of breath, sputum production and wheezing.    Musculoskeletal: Negative for myalgias.   Gastrointestinal: Negative for abdominal pain and nausea.   Neurological: Positive for headaches.       Objective:      Physical Exam   Constitutional: She is oriented to person, place, and time. She appears well-developed and well-nourished.   HENT:   Head: Normocephalic and atraumatic.   Right Ear: External ear normal.   Left Ear: External ear normal.   Eyes: EOM are normal. Pupils are equal, round, and reactive to light.   Neck: Normal range of motion. Neck supple. No JVD present. No tracheal deviation present. No thyromegaly present.   Cardiovascular: Normal rate, regular rhythm and normal heart sounds.  Exam reveals no gallop and no friction rub.    No murmur " heard.  Pulmonary/Chest: Breath sounds normal. No respiratory distress. She has no wheezes. She has no rales. She exhibits no tenderness.   Abdominal: Soft. Bowel sounds are normal. She exhibits no distension and no mass. There is no tenderness. There is no rebound and no guarding. No hernia.   Musculoskeletal: Normal range of motion. She exhibits no edema, tenderness or deformity.   Lymphadenopathy:     She has no cervical adenopathy.   Neurological: She is alert and oriented to person, place, and time. She displays normal reflexes. No cranial nerve deficit. She exhibits normal muscle tone. Coordination normal.   Skin: Skin is warm. Capillary refill takes less than 2 seconds. No rash noted. No erythema. No pallor.   Psychiatric: She has a normal mood and affect. Her behavior is normal. Judgment and thought content normal.   Vitals reviewed.      Assessment:       1. Bronchitis        Plan:         Bronchitis  -     amoxicillin-clavulanate (AUGMENTIN) 600-42.9 mg/5 mL SusR; 7 ml po twice a day for 10 days  Dispense: 140 mL; Refill: 0  -     benzonatate (TESSALON) 200 MG capsule; Take 1 capsule (200 mg total) by mouth 3 (three) times daily as needed for Cough.  Dispense: 30 capsule; Refill: 0  -     albuterol 90 mcg/actuation inhaler; Inhale 2 puffs into the lungs every 4 (four) hours as needed for Wheezing. Rescue  Dispense: 1 Inhaler; Refill: 0  -     betamethasone acetate-betamethasone sodium phosphate injection 9 mg; Inject 1.5 mLs (9 mg total) into the muscle one time.      Follow up with your doctor in a few days as needed.  Return to the urgent care or go to the ER if symptoms get worse.    Benjamín Mendoza MD

## 2017-12-11 NOTE — PATIENT INSTRUCTIONS
What Is Acute Bronchitis?  Acute bronchitis is when the airways in your lungs (bronchial tubes) become red and swollen (inflamed). It is usually caused by a viral infection. But it can also occur because of a bacteria or allergen. Symptoms include a cough that produces yellow or greenish mucus and can last for days or sometimes weeks.  Inside healthy lungs    Air travels in and out of the lungs through the airways. The linings of these airways produce sticky mucus. This mucus traps particles that enter the lungs. Tiny structures called cilia then sweep the particles out of the airways.     Healthy airway: Airways are normally open. Air moves in and out easily.      Healthy cilia: Tiny, hairlike cilia sweep mucus and particles up and out of the airways.   Lungs with bronchitis  Bronchitis often occurs with a cold or the flu virus. The airways become inflamed (red and swollen). There is a deep hacking cough from the extra mucus. Other symptoms may include:  · Wheezing  · Chest discomfort  · Shortness of breath  · Mild fever  A second infection, this time due to bacteria, may then occur. And airways irritated by allergens or smoke are more likely to get infected.        Inflamed airway: Inflammation and extra mucus narrow the airway, causing shortness of breath.      Impaired cilia: Extra mucus impairs cilia, causing congestion and wheezing. Smoking makes the problem worse.   Making a diagnosis  A physical exam, health history, and certain tests help your healthcare provider make the diagnosis.  Health history  Your healthcare provider will ask you about your symptoms.  The exam  Your provider listens to your chest for signs of congestion. He or she may also check your ears, nose, and throat.  Possible tests  · A sputum test for bacteria. This requires a sample of mucus from your lungs.  · A nasal or throat swab. This tests to see if you have a bacterial infection.  · A chest X-ray. This is done if your healthcare  provider thinks you have pneumonia.  · Tests to check for an underlying condition. Other tests may be done to check for things such as allergies, asthma, or COPD (chronic obstructive pulmonary disease). You may need to see a specialist for more lung function testing.  Treating a cough  The main treatment for bronchitis is easing symptoms. Avoiding smoke, allergens, and other things that trigger coughing can often help. If the infection is bacterial, you may be given antibiotics. During the illness, it's important to get plenty of sleep. To ease symptoms:  · Dont smoke. Also avoid secondhand smoke.  · Use a humidifier. Or try breathing in steam from a hot shower. This may help loosen mucus.  · Drink a lot of water and juice. They can soothe the throat and may help thin mucus.  · Sit up or use extra pillows when in bed. This helps to lessen coughing and congestion.  · Ask your provider about using medicine. Ask about using cough medicine, pain and fever medicine, or a decongestant.  Antibiotics  Most cases of bronchitis are caused by cold or flu viruses. They dont need antibiotics to treat them, even if your mucus is thick and green or yellow. Antibiotics dont treat viral illness and antibiotics have not been shown to have any benefit in cases of acute bronchitis. Taking antibiotics when they are not needed increases your risk of getting an infection later that is antibiotic-resistant. Antibiotics can also cause severe cases of diarrhea that require other antibiotics to treat.  It is important that you accept your healthcare provider's opinion to not use antibiotics. Your provider will prescribe antibiotics if the infection is caused by bacteria. If they are prescribed:  · Take all of the medicine. Take the medicine until it is used up, even if symptoms have improved. If you dont, the bronchitis may come back.  · Take the medicines as directed. For instance, some medicines should be taken with food.  · Ask about  side effects. Ask your provider or pharmacist what side effects are common, and what to do about them.  Follow-up care  You should see your provider again in 2 to 3 weeks. By this time, symptoms should have improved. An infection that lasts longer may mean you have a more serious problem.  Prevention  · Avoid tobacco smoke. If you smoke, quit. Stay away from smoky places. Ask friends and family not to smoke around you, or in your home or car.  · Get checked for allergies.  · Ask your provider about getting a yearly flu shot. Also ask about pneumococcal or pneumonia shots.  · Wash your hands often. This helps reduce the chance of picking up viruses that cause colds and flu.  Call your healthcare provider if:  · Symptoms worsen, or you have new symptoms  · Breathing problems worsen or  become severe  · Symptoms dont get better within a week, or within 3 days of taking antibiotics   Date Last Reviewed: 2/1/2017  © 5929-1133 [employer]. [address] [place] [address]. All rights reserved. This information is not intended as a substitute for professional medical care. Always follow your healthcare professional's instructions.      Follow up with your doctor in a few days as needed.  Return to the urgent care or go to the ER if symptoms get worse.    Benjamín Mendoza MD

## 2018-01-03 DIAGNOSIS — F51.01 PRIMARY INSOMNIA: ICD-10-CM

## 2018-01-03 RX ORDER — CLONAZEPAM 0.5 MG/1
0.5 TABLET ORAL NIGHTLY
Qty: 30 TABLET | Refills: 0 | Status: SHIPPED | OUTPATIENT
Start: 2018-01-03 | End: 2018-01-05 | Stop reason: SDUPTHER

## 2018-01-03 NOTE — TELEPHONE ENCOUNTER
----- Message from Brianne Beach sent at 1/2/2018  3:04 PM CST -----  Contact: LEONELA MONTOYA [[id number]]  x_  1st Request  _  2nd Request  _  3rd Request        Who: LEONELA MONTOYA [[id number]]    Why: Requesting a call back in regards to getting an appointment and the patient needs to be seen before the 12th of this month. Please call her.     What Number to Call Back: [phone]    When to Expect a call back: (Within 24 hours)    Please return the call at earliest convenience. Thanks!

## 2018-01-03 NOTE — TELEPHONE ENCOUNTER
----- Message from Brianne Beach sent at 1/2/2018  3:09 PM CST -----  Contact: LEONELA MONTOYA [[id number]]      x_  1st Request  _  2nd Request  _  3rd Request    Please refill the medication(s) listed below. Please call the patient when the prescription(s) is ready for  at this phone number             Medication #1  clonazePAM (KLONOPIN) 0.5 MG tablet 30 tablet     Medication #2      Preferred Pharmacy:

## 2018-01-05 ENCOUNTER — OFFICE VISIT (OUTPATIENT)
Dept: SLEEP MEDICINE | Facility: CLINIC | Age: 58
End: 2018-01-05
Payer: COMMERCIAL

## 2018-01-05 VITALS
SYSTOLIC BLOOD PRESSURE: 109 MMHG | HEIGHT: 65 IN | WEIGHT: 129 LBS | HEART RATE: 70 BPM | BODY MASS INDEX: 21.49 KG/M2 | DIASTOLIC BLOOD PRESSURE: 68 MMHG

## 2018-01-05 DIAGNOSIS — F51.01 PRIMARY INSOMNIA: ICD-10-CM

## 2018-01-05 PROCEDURE — 99213 OFFICE O/P EST LOW 20 MIN: CPT | Mod: S$GLB,,, | Performed by: NURSE PRACTITIONER

## 2018-01-05 PROCEDURE — 99999 PR PBB SHADOW E&M-EST. PATIENT-LVL III: CPT | Mod: PBBFAC,,, | Performed by: NURSE PRACTITIONER

## 2018-01-05 RX ORDER — CLONAZEPAM 0.5 MG/1
0.5 TABLET ORAL NIGHTLY
Qty: 30 TABLET | Refills: 0 | Status: SHIPPED | OUTPATIENT
Start: 2018-03-23 | End: 2018-03-27 | Stop reason: SDUPTHER

## 2018-01-05 RX ORDER — CLONAZEPAM 0.5 MG/1
0.5 TABLET ORAL NIGHTLY
Qty: 30 TABLET | Refills: 0 | Status: SHIPPED | OUTPATIENT
Start: 2018-02-23 | End: 2018-01-05 | Stop reason: SDUPTHER

## 2018-01-05 RX ORDER — CLONAZEPAM 0.5 MG/1
0.5 TABLET ORAL NIGHTLY
Qty: 30 TABLET | Refills: 0 | Status: SHIPPED | OUTPATIENT
Start: 2018-03-23 | End: 2018-01-05 | Stop reason: SDUPTHER

## 2018-01-05 RX ORDER — CLONAZEPAM 0.5 MG/1
0.5 TABLET ORAL NIGHTLY
Qty: 30 TABLET | Refills: 0 | Status: SHIPPED | OUTPATIENT
Start: 2018-01-23 | End: 2018-01-05 | Stop reason: SDUPTHER

## 2018-01-05 NOTE — PROGRESS NOTES
Dora Porter Taylor seen in follow-up for primary insomnia. Last seen in clinic by RAYNA Pearl NP 07/13/2017. This is her initial visit with me.     INTERVAL HISTORY:    01/05/2018 VIVIANA Christensen NP: Pt made today's appointment due to difficulty refilling clonazepam when she is out of town. Was recently out of town and ran out of clonazepam taken for primary insomnia and was told by out-of-town pharmacy that she can not complete interstate transfer of controlled medications. Here today to obtain hard copy Rx for clonazepam since she has future travel plans that will time next refill while she is not in home pharmacy. Reports that she is able to have continuous sleep with clonazepam and absolutely can not sleep without it, especially when travelling. 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  She has decreased trazodone to 75 mg from 150 mg. Denies wearing off effects in the mornings.     Of note, pt did not bring CPAP machine today since she was coming in for medication refill for insomnia. But, admits she does not use it nightly, in part due to busy travel schedule.     07/13/2017 RAYNA Saucedo NP Dora Charlotte Vazquez a  57 y.o. female patient returns for the management of JONATHAN and insomnia, last seen by MD 1/9/17, this is my initial visit with her.   Since last seen, she is still not using apap. Never had adherence monitoring after setup '14. Found using it disrupted her sleep more. With taking trazadone 150mg 1/2 tab and klonopin 0.5mg qhs she is able to sleep undisturbed. Denies am hangover. ESS=1. Very high work stress and memory loss issues, affecting work so much that she thinks she might lose her job. Held responsible for things out of her control at times ([employer]). Sleeping 10:30-6am. Feels exhausted and fatigued.       1/9/17 Dr. Florian  She is reporting recurrence of night time awakenings at 3 am    Trazodone 150 mg + clonazepam 0.5 mg po  "before bedtime, chronically - she repots it is the only thing that has worked. She reports she feels rested after taking. She has cut trazodone to 75 mg for about a month, but feels like she should go back up.    Bedtime at 9 pm;   LO at 10 pm  SOL: 15-20 mins (with meds)  OOB: 6:30-7 am    No longer traveling to [facility].    Trying to make CPAP work for mild sleep apnea. Using it since Fall 2014. Sleeps with it a max of 4 hours. Takes it off because of mask. CPAP is helping the snoring, but she is not sure it is helping her sleep quality.    DME = [facility]    ESS = 2    10/13/14  PSG: Most hypopneas occurred at wake-sleep transition and during REM stage sleep. The overall AHI was 9.0 with an oxygen kevin of 90.0%. The supine AHI was 10.8 and the REM AHI was 26.2. The patient did not qualify for a split night study due to an insufficient number of events in the first half of the study.     SLEEP HISTORY:  Chronic insomnia for 5 years, associated with jas-menopausal symptoms (insomnia was primary complaint, but also experienced "cold flashes").  Menopausal symptoms have been controlled with progesterone/testosterone treatments.  Travels to [facility] every 2 weeks (for last 5 months) for business.  Experiencing severe jetlag symptoms following her travels. (Can only sleep for 10 hours for the first 3-4 days following her trips.)  Valium and clonazepam have been helpful with re-acclimating to new time zone.  She reports having tried multiple other sleep-aid medications with limited success: trazodone, vistaril, ambien, ambien CR, intermezzo  When not traveling, feesl like she is lying there for at least an hour, "can't fall asleep due to not being able to turn brain off".    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  Prescribed valium for this condition, which incidentally helped her jetlag insomnia remarkably well.  She reports prior sleep apnea study was negative about 5 years ago. Weight unchanged  She denies " "snoring, gasping for air, no shortness of breath in sleep  Denies RLS, Denies leg kicking at night  Denies alcohol use.  Denies mood disturbance, depression, or anxiety: Prior prozac use following the death of her parents.    Past Medical History:   Diagnosis Date    Arthritis     COPD (chronic obstructive pulmonary disease)     Depression     GERD (gastroesophageal reflux disease)     History of colonic polyps     Hypertension     Hypothyroidism     Insomnia     Ulcer        REVIEW OF SYSTEMS: 4# gain, work stress, otherwise a balance review of 10-systems is negative.    PHYSICAL EXAM:  /68   Pulse 70   Ht 5' 5" (1.651 m)   Wt 58.5 kg (129 lb)   BMI 21.47 kg/m²   GENERAL: Well groomed; Normally developed;  PSYCH: Affect is normal.     ASSESSMENT:    1. Insomnia NEC with underlying tension state. Difficulty turning off mind/pervasive thoughts. She stopped hormone replacement for menopausal symptoms, and insomnia is improved. Still managing tension/anxiety at bedtime with benzo and trazodone. Sleep onset and maintenance stable on current dose medications.     PLAN:    1. Continue clonazepam 0.5 mg on nightly basis for tension/anxiety. Do not combine with alcohol. Provided sequential 3 hardcopy Rxs start dates: 01/23, 2/23, 3/23/2018  2. ContinueTrazodone 75 mg 1 tab qhs  3. Encourage nightly use of CPAP for JONATHAN and explicit discussion regarding relationship between hypnotic sedative rxs and untreated JONATHAN; pt unable to schedule recommended adherence f/u for CPAP today as she does not know upcoming schedule since she is starting new job end of January; pt to call to make 6-8 week f/u with Dr. Florian only per pt preference.     "

## 2018-03-05 ENCOUNTER — OFFICE VISIT (OUTPATIENT)
Dept: SLEEP MEDICINE | Facility: CLINIC | Age: 58
End: 2018-03-05
Payer: COMMERCIAL

## 2018-03-05 VITALS
HEART RATE: 67 BPM | HEIGHT: 65 IN | DIASTOLIC BLOOD PRESSURE: 76 MMHG | SYSTOLIC BLOOD PRESSURE: 115 MMHG | WEIGHT: 129 LBS | BODY MASS INDEX: 21.49 KG/M2

## 2018-03-05 DIAGNOSIS — G47.33 OBSTRUCTIVE SLEEP APNEA (ADULT) (PEDIATRIC): Primary | ICD-10-CM

## 2018-03-05 DIAGNOSIS — F51.01 PRIMARY INSOMNIA: ICD-10-CM

## 2018-03-05 PROCEDURE — 3078F DIAST BP <80 MM HG: CPT | Mod: S$GLB,,, | Performed by: NURSE PRACTITIONER

## 2018-03-05 PROCEDURE — 3074F SYST BP LT 130 MM HG: CPT | Mod: S$GLB,,, | Performed by: NURSE PRACTITIONER

## 2018-03-05 PROCEDURE — 99214 OFFICE O/P EST MOD 30 MIN: CPT | Mod: SA,S$GLB,, | Performed by: NURSE PRACTITIONER

## 2018-03-05 PROCEDURE — 99999 PR PBB SHADOW E&M-EST. PATIENT-LVL III: CPT | Mod: PBBFAC,,, | Performed by: NURSE PRACTITIONER

## 2018-03-05 RX ORDER — TRAZODONE HYDROCHLORIDE 150 MG/1
TABLET ORAL
Qty: 30 TABLET | Refills: 11 | Status: SHIPPED | OUTPATIENT
Start: 2018-03-05 | End: 2018-07-19 | Stop reason: SDUPTHER

## 2018-03-05 NOTE — PROGRESS NOTES
Dora Vazquez seen in follow-up for JONATHAN management and CPAP equipment check.     03/05/2018 Checked-in 17 minutes late for 20 min appt.     INTERVAL HISTORY:    03/05/2018 VIVIANA Christensen NP:  Pt thought scheduled appt with Dr. Florian was for today when it was actually scheduled for 03/15/2018. Pt is moving to [place] and wanted to get prescription updated for clonazepam, trazodone, and get new Rx for travel CPAP machine. Did not bring CPAP machine again today. Admits that CPAP use is very little, citing regular travel as main reason. Reports that current CPAP machine is too bulky for travel and she believes travel CPAP machine would result in better use. Trazodone and clonazepam controls anxiety and helps induce sleep without untoward side effects. Admits that despite medications she often has interrupted sleep waking up around 2 am and 5 am. ESS 11    CPAP Interrogation: did not bring machine    01/05/2018 VIVIANA Christensen NP: Pt made today's appointment due to difficulty refilling clonazepam when she is out of town. Was recently out of town and ran out of clonazepam taken for primary insomnia and was told by out-of-town pharmacy that she can not complete interstate transfer of controlled medications. Here today to obtain hard copy Rx for clonazepam since she has future travel plans that will time next refill while she is not in home pharmacy. Reports that she is able to have continuous sleep with clonazepam and absolutely can not sleep without it, especially when travelling. 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  She has decreased trazodone to 75 mg from 150 mg. Denies wearing off effects in the mornings.     Of note, pt did not bring CPAP machine today since she was coming in for medication refill for insomnia. But, admits she does not use it nightly, in part due to busy travel schedule.     07/13/2017 RAYNA Vazquez a  57 y.o.  "female patient returns for the management of JONATHAN and insomnia, last seen by MD 1/9/17, this is my initial visit with her.   Since last seen, she is still not using apap. Never had adherence monitoring after setup '14. Found using it disrupted her sleep more. With taking trazadone 150mg 1/2 tab and klonopin 0.5mg qhs she is able to sleep undisturbed. Denies am hangover. ESS=1. Very high work stress and memory loss issues, affecting work so much that she thinks she might lose her job. Held responsible for things out of her control at times ([employer]). Sleeping 10:30-6am. Feels exhausted and fatigued.       1/9/17 Dr. Florian  She is reporting recurrence of night time awakenings at 3 am    Trazodone 150 mg + clonazepam 0.5 mg po before bedtime, chronically - she repots it is the only thing that has worked. She reports she feels rested after taking. She has cut trazodone to 75 mg for about a month, but feels like she should go back up.    Bedtime at 9 pm;   LO at 10 pm  SOL: 15-20 mins (with meds)  OOB: 6:30-7 am    No longer traveling to [facility].    Trying to make CPAP work for mild sleep apnea. Using it since Fall 2014. Sleeps with it a max of 4 hours. Takes it off because of mask. CPAP is helping the snoring, but she is not sure it is helping her sleep quality.    DME = [facility]    ESS = 2    10/13/14  PSG: Most hypopneas occurred at wake-sleep transition and during REM stage sleep. The overall AHI was 9.0 with an oxygen kevin of 90.0%. The supine AHI was 10.8 and the REM AHI was 26.2. The patient did not qualify for a split night study due to an insufficient number of events in the first half of the study.     SLEEP HISTORY:  Chronic insomnia for 5 years, associated with jas-menopausal symptoms (insomnia was primary complaint, but also experienced "cold flashes").  Menopausal symptoms have been controlled with progesterone/testosterone treatments.  Travels to [facility] every 2 weeks (for last 5 months) for " "business.  Experiencing severe jetlag symptoms following her travels. (Can only sleep for 10 hours for the first 3-4 days following her trips.)  Valium and clonazepam have been helpful with re-acclimating to new time zone.  She reports having tried multiple other sleep-aid medications with limited success: trazodone, vistaril, ambien, ambien CR, intermezzo  When not traveling, feesl like she is lying there for at least an hour, "can't fall asleep due to not being able to turn brain off".    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  Prescribed valium for this condition, which incidentally helped her jetlag insomnia remarkably well.  She reports prior sleep apnea study was negative about 5 years ago. Weight unchanged  She denies snoring, gasping for air, no shortness of breath in sleep  Denies RLS, Denies leg kicking at night  Denies alcohol use.  Denies mood disturbance, depression, or anxiety: Prior prozac use following the death of her parents.    Past Medical History:   Diagnosis Date    Arthritis     COPD (chronic obstructive pulmonary disease)     Depression     GERD (gastroesophageal reflux disease)     History of colonic polyps     Hypertension     Hypothyroidism     Insomnia     Ulcer        REVIEW OF SYSTEMS: 4# gain, work stress, otherwise a balance review of 10-systems is negative.    PHYSICAL EXAM:  /76   Pulse 67   Ht 5' 5" (1.651 m)   Wt 58.5 kg (129 lb)   BMI 21.47 kg/m²   GENERAL: Well groomed; Normally developed;  PSYCH: Affect is normal.     ASSESSMENT:    JONATHAN, mild by AHI. The patient symptomatically has interrupted sleep, unrefreshing sleep and daytime fatigue. Medical co-morbidities: depression, GERD, and hypothyroidism. This warrants treatment.     Insomnia NEC with underlying tension state. Difficulty turning off mind/pervasive thoughts. She stopped hormone replacement for menopausal symptoms, and insomnia is improved. Still managing tension/anxiety at bedtime with " benzo and trazodone. Sleep onset and maintenance stable on current dose medications.     PLAN:     JONATHAN:  -new rx for travel machine. Discussed potential insurance resistance since last machine issued < 5 years, per pt she has different insurance; also discussed that travel machine may not be covered by insurance. Pt will await [facility] call regarding cost analysis  -encouraged nightly CPAP use, at least 4 hours a night, ultimately goal hourly use  -RTC 31 - 90 days after PAP      Insomnia:  -Continue clonazepam 0.5 mg on nightly basis for tension/anxiety. Do not combine with alcohol. Pt has not filled two hard copies provided during 01/05/2018 visit - 2/23, 3/23/2018  -ContinueTrazodone 75 mg 1 tab qhs. Refilled.   -Encourage nightly use of CPAP for JONATHAN and explicit discussion regarding relationship between hypnotic sedative rxs and untreated JONATHAN    Education: During our discussion today, we talked about the etiology of obstructive sleep apnea as well as the potential ramifications of untreated sleep apnea, which could include daytime sleepiness, hypertension, heart disease and/or stroke. We discussed potential treatment options, which could include weight loss, body positioning, continuous positive airway pressure (CPAP), or referral for surgical consideration.     Behavior modification which includes losing weight, exercising, changing the sleep position, abstaining from alcohol, and avoiding certain medications    Precautions: The patient was advised to abstain from driving should they feel sleepy or drowsy

## 2018-03-15 ENCOUNTER — OFFICE VISIT (OUTPATIENT)
Dept: URGENT CARE | Facility: CLINIC | Age: 58
End: 2018-03-15
Payer: COMMERCIAL

## 2018-03-15 VITALS
HEIGHT: 66 IN | HEART RATE: 102 BPM | TEMPERATURE: 99 F | DIASTOLIC BLOOD PRESSURE: 93 MMHG | SYSTOLIC BLOOD PRESSURE: 142 MMHG | BODY MASS INDEX: 20.89 KG/M2 | WEIGHT: 130 LBS | OXYGEN SATURATION: 100 % | RESPIRATION RATE: 18 BRPM

## 2018-03-15 DIAGNOSIS — H66.92 LEFT OTITIS MEDIA, UNSPECIFIED OTITIS MEDIA TYPE: ICD-10-CM

## 2018-03-15 DIAGNOSIS — J02.8 ACUTE PHARYNGITIS DUE TO OTHER SPECIFIED ORGANISMS: Primary | ICD-10-CM

## 2018-03-15 PROCEDURE — 99213 OFFICE O/P EST LOW 20 MIN: CPT | Mod: 25,S$GLB,, | Performed by: INTERNAL MEDICINE

## 2018-03-15 PROCEDURE — 3080F DIAST BP >= 90 MM HG: CPT | Mod: CPTII,S$GLB,, | Performed by: INTERNAL MEDICINE

## 2018-03-15 PROCEDURE — 3077F SYST BP >= 140 MM HG: CPT | Mod: CPTII,S$GLB,, | Performed by: INTERNAL MEDICINE

## 2018-03-15 PROCEDURE — 96372 THER/PROPH/DIAG INJ SC/IM: CPT | Mod: S$GLB,,, | Performed by: INTERNAL MEDICINE

## 2018-03-15 RX ORDER — BETAMETHASONE SODIUM PHOSPHATE AND BETAMETHASONE ACETATE 3; 3 MG/ML; MG/ML
9 INJECTION, SUSPENSION INTRA-ARTICULAR; INTRALESIONAL; INTRAMUSCULAR; SOFT TISSUE ONCE
Status: COMPLETED | OUTPATIENT
Start: 2018-03-15 | End: 2018-03-15

## 2018-03-15 RX ORDER — HYDROCODONE BITARTRATE AND ACETAMINOPHEN 7.5; 325 MG/1; MG/1
1 TABLET ORAL EVERY 6 HOURS PRN
Qty: 20 TABLET | Refills: 0 | Status: SHIPPED | OUTPATIENT
Start: 2018-03-15 | End: 2020-04-24

## 2018-03-15 RX ORDER — AMOXICILLIN AND CLAVULANATE POTASSIUM 875; 125 MG/1; MG/1
1 TABLET, FILM COATED ORAL EVERY 12 HOURS
Qty: 20 TABLET | Refills: 0 | Status: SHIPPED | OUTPATIENT
Start: 2018-03-15 | End: 2018-03-25

## 2018-03-15 RX ADMIN — BETAMETHASONE SODIUM PHOSPHATE AND BETAMETHASONE ACETATE 9 MG: 3; 3 INJECTION, SUSPENSION INTRA-ARTICULAR; INTRALESIONAL; INTRAMUSCULAR; SOFT TISSUE at 06:03

## 2018-03-15 NOTE — PROGRESS NOTES
"Subjective:       Patient ID: Dora Vazquez is a 57 y.o. female.    Vitals:  height is 5' 5.5" (1.664 m) and weight is 59 kg (130 lb). Her temperature is 98.6 °F (37 °C). Her blood pressure is 142/93 (abnormal) and her pulse is 102. Her respiration is 18 and oxygen saturation is 100%.     Chief Complaint: Sore Throat    Sore Throat    This is a new problem. The current episode started today. The problem has been gradually worsening. There has been no fever. The pain is mild. Associated symptoms include coughing, ear pain and trouble swallowing. Pertinent negatives include no abdominal pain, congestion, headaches, hoarse voice or shortness of breath. She has tried acetaminophen for the symptoms. The treatment provided no relief.     Review of Systems   Constitution: Positive for malaise/fatigue. Negative for chills and fever.   HENT: Positive for ear pain, sore throat and trouble swallowing. Negative for congestion and hoarse voice.    Eyes: Negative for discharge and redness.   Cardiovascular: Negative for chest pain, dyspnea on exertion and leg swelling.   Respiratory: Positive for cough. Negative for shortness of breath, sputum production and wheezing.    Musculoskeletal: Negative for myalgias.   Gastrointestinal: Negative for abdominal pain and nausea.   Neurological: Negative for headaches.       Objective:      Physical Exam   Constitutional: She appears well-developed and well-nourished.   HENT:   Head: Normocephalic and atraumatic.   Right Ear: Hearing, tympanic membrane, external ear and ear canal normal.   Left Ear: Tympanic membrane is injected and erythematous. A middle ear effusion is present. Decreased hearing is noted.   Mouth/Throat: Oropharyngeal exudate, posterior oropharyngeal edema and posterior oropharyngeal erythema present.   Eyes: Conjunctivae and EOM are normal. Pupils are equal, round, and reactive to light.   Neck: Normal range of motion. Neck supple.   Nursing note and vitals " reviewed.      Assessment:       1. Acute pharyngitis due to other specified organisms    2. Left otitis media, unspecified otitis media type        Plan:         Acute pharyngitis due to other specified organisms  -     betamethasone acetate-betamethasone sodium phosphate injection 9 mg; Inject 1.5 mLs (9 mg total) into the muscle once.  -     amoxicillin-clavulanate 875-125mg (AUGMENTIN) 875-125 mg per tablet; Take 1 tablet by mouth every 12 (twelve) hours.  Dispense: 20 tablet; Refill: 0    Left otitis media, unspecified otitis media type  -     betamethasone acetate-betamethasone sodium phosphate injection 9 mg; Inject 1.5 mLs (9 mg total) into the muscle once.  -     hydrocodone-acetaminophen 7.5-325mg (NORCO) 7.5-325 mg per tablet; Take 1 tablet by mouth every 6 (six) hours as needed for Pain.  Dispense: 20 tablet; Refill: 0  -     amoxicillin-clavulanate 875-125mg (AUGMENTIN) 875-125 mg per tablet; Take 1 tablet by mouth every 12 (twelve) hours.  Dispense: 20 tablet; Refill: 0

## 2018-03-27 ENCOUNTER — TELEPHONE (OUTPATIENT)
Dept: SLEEP MEDICINE | Facility: CLINIC | Age: 58
End: 2018-03-27

## 2018-03-27 DIAGNOSIS — F51.01 PRIMARY INSOMNIA: ICD-10-CM

## 2018-03-27 RX ORDER — CLONAZEPAM 0.5 MG/1
0.5 TABLET ORAL NIGHTLY
Qty: 30 TABLET | Refills: 0 | Status: SHIPPED | OUTPATIENT
Start: 2018-03-27 | End: 2018-04-06 | Stop reason: SDUPTHER

## 2018-03-27 NOTE — TELEPHONE ENCOUNTER
Left vm to let patient know to let them know that their prescription has been sent to the pharmacy and filled.

## 2018-03-27 NOTE — TELEPHONE ENCOUNTER
verified. Clonazepam 0.5 mg last filled 02/22/2018.     30-day supply refilled and e-scribed to [facility] # [id number] per pt request.

## 2018-03-27 NOTE — TELEPHONE ENCOUNTER
----- Message from Raudel Martin sent at 3/27/2018 12:01 PM CDT -----  Contact: [facility]  x_ 1st Request   _ 2nd Request   _ 3rd Request     Who: LEONELA MONTOYA [[id number]]    Why: pharmacy is requesting refill on Rx clonazePAM (KLONOPIN) 0.5 MG tablet be sent to [facility]/pharmacy #[id number] - [place], [place] - [address] [phone]    What Number to Call Back: [phone]    When to Expect a call back: (Before the end of the day)   -- if call after 3:00 call back will be tomorrow.

## 2018-03-27 NOTE — TELEPHONE ENCOUNTER
----- Message from Kellee Luque sent at 3/27/2018  2:04 PM CDT -----  Contact: pt  _  1st Request  _  2nd Request  _  3rd Request        Who: pt    Why: Requesting a call back in regards to returned the nurse's phone call. Please call pt   Please return the call at earliest convenience. Thanks!    What Number to Call Back:[phone]      When to Expect a call back: (Within 24 hours)

## 2018-04-06 DIAGNOSIS — F51.01 PRIMARY INSOMNIA: ICD-10-CM

## 2018-04-06 NOTE — TELEPHONE ENCOUNTER
----- Message from Kellee Luque sent at 4/6/2018 10:18 AM CDT -----  Contact: caridad Hatfield the clinic reply in [facility]: no      Please refill the medication(s) listed below. Please call the patient when the prescription(s) is ready for  at this phone number   [phone]        Medication #1 clonazePAM (KLONOPIN) 0.5 MG tablet -    Medication #2       [facility]: [facility] [address]

## 2018-04-09 RX ORDER — CLONAZEPAM 0.5 MG/1
0.5 TABLET ORAL NIGHTLY
Qty: 30 TABLET | Refills: 2 | Status: SHIPPED | OUTPATIENT
Start: 2018-04-09 | End: 2018-05-18 | Stop reason: SDUPTHER

## 2018-05-18 DIAGNOSIS — F51.01 PRIMARY INSOMNIA: ICD-10-CM

## 2018-05-18 NOTE — TELEPHONE ENCOUNTER
Patient has moved to [place] and she plans on still using you which she doesn't mind flying/driving to see you. She wants to know if you can do 6 months supply of the Klonopin and she'll come in for a appt in 5 months and get another refill. She did NOT get the RX from the [facility] location they wouldn't transfer it over to [place].    Rufus Smith MA

## 2018-05-22 RX ORDER — CLONAZEPAM 0.5 MG/1
0.5 TABLET ORAL NIGHTLY
Qty: 30 TABLET | Refills: 5 | Status: SHIPPED | OUTPATIENT
Start: 2018-05-22 | End: 2018-06-21

## 2018-07-19 DIAGNOSIS — F51.01 PRIMARY INSOMNIA: ICD-10-CM

## 2018-07-19 RX ORDER — TRAZODONE HYDROCHLORIDE 150 MG/1
TABLET ORAL
Qty: 30 TABLET | Refills: 2 | Status: SHIPPED | OUTPATIENT
Start: 2018-07-19

## 2018-08-08 ENCOUNTER — TELEPHONE (OUTPATIENT)
Dept: SLEEP MEDICINE | Facility: CLINIC | Age: 58
End: 2018-08-08

## 2018-08-08 DIAGNOSIS — G47.33 OBSTRUCTIVE SLEEP APNEA: Primary | ICD-10-CM

## 2018-08-08 NOTE — TELEPHONE ENCOUNTER
----- Message from Charly Kimbrough sent at 8/8/2018 10:14 AM CDT -----  Regarding: Cpap Supplies  Contact: [phone]  Patient has contacted me regarding new cpap supplies and uses a [facility] but her Rx is for a Nasal. Please update Rx so I can send it out to Mrs Vazquez. Thanks!

## 2018-09-17 RX ORDER — HYDROCHLOROTHIAZIDE 25 MG/1
25 TABLET ORAL DAILY
Qty: 90 TABLET | Refills: 0 | Status: SHIPPED | OUTPATIENT
Start: 2018-09-17 | End: 2020-04-24

## 2020-01-19 ENCOUNTER — HOSPITAL ENCOUNTER (EMERGENCY)
Facility: HOSPITAL | Age: 60
Discharge: HOME | End: 2020-01-19
Attending: EMERGENCY MEDICINE
Payer: COMMERCIAL

## 2020-01-19 ENCOUNTER — NURSE TRIAGE (OUTPATIENT)
Dept: ADMINISTRATIVE | Facility: CLINIC | Age: 60
End: 2020-01-19

## 2020-01-19 VITALS
HEART RATE: 76 BPM | TEMPERATURE: 98 F | SYSTOLIC BLOOD PRESSURE: 146 MMHG | WEIGHT: 126 LBS | HEIGHT: 65 IN | BODY MASS INDEX: 20.99 KG/M2 | RESPIRATION RATE: 18 BRPM | DIASTOLIC BLOOD PRESSURE: 83 MMHG | OXYGEN SATURATION: 99 %

## 2020-01-19 DIAGNOSIS — H57.11 EYE PAIN, RIGHT: Primary | ICD-10-CM

## 2020-01-19 DIAGNOSIS — H20.9 IRITIS: ICD-10-CM

## 2020-01-19 LAB
ANION GAP SERPL CALC-SCNC: 6 MMOL/L (ref 8–16)
BASOPHILS # BLD AUTO: 0.12 K/UL (ref 0–0.2)
BASOPHILS NFR BLD: 1.2 % (ref 0–1.9)
BUN SERPL-MCNC: 12 MG/DL (ref 6–20)
CALCIUM SERPL-MCNC: 10 MG/DL (ref 8.7–10.5)
CHLORIDE SERPL-SCNC: 104 MMOL/L (ref 95–110)
CO2 SERPL-SCNC: 31 MMOL/L (ref 23–29)
CREAT SERPL-MCNC: 0.7 MG/DL (ref 0.5–1.4)
CRP SERPL-MCNC: 0.8 MG/L (ref 0–8.2)
DIFFERENTIAL METHOD: ABNORMAL
EOSINOPHIL # BLD AUTO: 0.2 K/UL (ref 0–0.5)
EOSINOPHIL NFR BLD: 1.6 % (ref 0–8)
ERYTHROCYTE [DISTWIDTH] IN BLOOD BY AUTOMATED COUNT: 12.6 % (ref 11.5–14.5)
ERYTHROCYTE [SEDIMENTATION RATE] IN BLOOD BY WESTERGREN METHOD: 6 MM/HR (ref 0–36)
EST. GFR  (AFRICAN AMERICAN): >60 ML/MIN/1.73 M^2
EST. GFR  (NON AFRICAN AMERICAN): >60 ML/MIN/1.73 M^2
GLUCOSE SERPL-MCNC: 117 MG/DL (ref 70–110)
HCT VFR BLD AUTO: 42 % (ref 37–48.5)
HGB BLD-MCNC: 13.7 G/DL (ref 12–16)
IMM GRANULOCYTES # BLD AUTO: 0.06 K/UL (ref 0–0.04)
IMM GRANULOCYTES NFR BLD AUTO: 0.6 % (ref 0–0.5)
LYMPHOCYTES # BLD AUTO: 3.5 K/UL (ref 1–4.8)
LYMPHOCYTES NFR BLD: 36.3 % (ref 18–48)
MCH RBC QN AUTO: 29.7 PG (ref 27–31)
MCHC RBC AUTO-ENTMCNC: 32.6 G/DL (ref 32–36)
MCV RBC AUTO: 91 FL (ref 82–98)
MONOCYTES # BLD AUTO: 0.5 K/UL (ref 0.3–1)
MONOCYTES NFR BLD: 5.5 % (ref 4–15)
NEUTROPHILS # BLD AUTO: 5.3 K/UL (ref 1.8–7.7)
NEUTROPHILS NFR BLD: 54.8 % (ref 38–73)
NRBC BLD-RTO: 0 /100 WBC
PLATELET # BLD AUTO: 211 K/UL (ref 150–350)
PMV BLD AUTO: 11.6 FL (ref 9.2–12.9)
POTASSIUM SERPL-SCNC: 4.2 MMOL/L (ref 3.5–5.1)
RBC # BLD AUTO: 4.62 M/UL (ref 4–5.4)
SODIUM SERPL-SCNC: 141 MMOL/L (ref 136–145)
WBC # BLD AUTO: 9.63 K/UL (ref 3.9–12.7)

## 2020-01-19 PROCEDURE — 99284 EMERGENCY DEPT VISIT MOD MDM: CPT | Mod: 25

## 2020-01-19 PROCEDURE — 25000003 PHARM REV CODE 250: Performed by: EMERGENCY MEDICINE

## 2020-01-19 PROCEDURE — 85652 RBC SED RATE AUTOMATED: CPT

## 2020-01-19 PROCEDURE — 80048 BASIC METABOLIC PNL TOTAL CA: CPT

## 2020-01-19 PROCEDURE — 85025 COMPLETE CBC W/AUTO DIFF WBC: CPT

## 2020-01-19 PROCEDURE — 96374 THER/PROPH/DIAG INJ IV PUSH: CPT

## 2020-01-19 PROCEDURE — 99284 EMERGENCY DEPT VISIT MOD MDM: CPT | Mod: ,,, | Performed by: EMERGENCY MEDICINE

## 2020-01-19 PROCEDURE — 99284 PR EMERGENCY DEPT VISIT,LEVEL IV: ICD-10-PCS | Mod: ,,, | Performed by: EMERGENCY MEDICINE

## 2020-01-19 PROCEDURE — 86140 C-REACTIVE PROTEIN: CPT

## 2020-01-19 PROCEDURE — 63600175 PHARM REV CODE 636 W HCPCS: Performed by: EMERGENCY MEDICINE

## 2020-01-19 RX ORDER — METOCLOPRAMIDE HYDROCHLORIDE 5 MG/ML
10 INJECTION INTRAMUSCULAR; INTRAVENOUS
Status: COMPLETED | OUTPATIENT
Start: 2020-01-19 | End: 2020-01-19

## 2020-01-19 RX ORDER — PREDNISOLONE ACETATE 10 MG/ML
1 SUSPENSION/ DROPS OPHTHALMIC 4 TIMES DAILY
Qty: 10 ML | Refills: 1 | Status: SHIPPED | OUTPATIENT
Start: 2020-01-19 | End: 2020-01-19 | Stop reason: SDUPTHER

## 2020-01-19 RX ORDER — ACETAMINOPHEN 500 MG
1000 TABLET ORAL
Status: COMPLETED | OUTPATIENT
Start: 2020-01-19 | End: 2020-01-19

## 2020-01-19 RX ORDER — BRIMONIDINE TARTRATE 2 MG/ML
1 SOLUTION/ DROPS OPHTHALMIC 3 TIMES DAILY
Qty: 5 ML | Refills: 2 | Status: SHIPPED | OUTPATIENT
Start: 2020-01-19 | End: 2020-01-19 | Stop reason: SDUPTHER

## 2020-01-19 RX ORDER — PREDNISOLONE ACETATE 10 MG/ML
1 SUSPENSION/ DROPS OPHTHALMIC 4 TIMES DAILY
Qty: 10 ML | Refills: 1 | Status: SHIPPED | OUTPATIENT
Start: 2020-01-19 | End: 2020-04-24 | Stop reason: SDUPTHER

## 2020-01-19 RX ORDER — IBUPROFEN 400 MG/1
400 TABLET ORAL
Status: COMPLETED | OUTPATIENT
Start: 2020-01-19 | End: 2020-01-19

## 2020-01-19 RX ORDER — BRIMONIDINE TARTRATE 2 MG/ML
1 SOLUTION/ DROPS OPHTHALMIC 3 TIMES DAILY
Qty: 5 ML | Refills: 2 | Status: SHIPPED | OUTPATIENT
Start: 2020-01-19 | End: 2020-08-28 | Stop reason: SDUPTHER

## 2020-01-19 RX ORDER — TETRACAINE HYDROCHLORIDE 5 MG/ML
2 SOLUTION OPHTHALMIC
Status: COMPLETED | OUTPATIENT
Start: 2020-01-19 | End: 2020-01-19

## 2020-01-19 RX ADMIN — IBUPROFEN 400 MG: 400 TABLET, FILM COATED ORAL at 04:01

## 2020-01-19 RX ADMIN — TETRACAINE HYDROCHLORIDE 2 DROP: 5 SOLUTION OPHTHALMIC at 03:01

## 2020-01-19 RX ADMIN — METOCLOPRAMIDE 10 MG: 5 INJECTION, SOLUTION INTRAMUSCULAR; INTRAVENOUS at 05:01

## 2020-01-19 RX ADMIN — ACETAMINOPHEN 1000 MG: 500 TABLET ORAL at 02:01

## 2020-01-19 NOTE — ED PROVIDER NOTES
"Encounter Date: 1/19/2020    SCRIBE #1 NOTE: I, Edwin Fountain, am scribing for, and in the presence of,  Dr. Brunson. I have scribed the entire note.       History     Chief Complaint   Patient presents with    Headache     Pt reports headaches and "feeling of something floating in eyes".  Pt s/p biateral eye surgery recently     HPI     Time patient was seen by the provider: 2:33 PM      The patient is a 59 y.o. female with co-morbidities including: COPD, GERD, Depression, and HTN, who presents to the ED with a complaint of Headache. The patient states that she recently had a surgery for glaucoma and cataracts in each eye, before she moved here from [place]. Patient has been having a strong headache which she says feels like stabbing and is ranked at about an 8/10. She has had severe pain on the right eye. She says the left eye does not have the same floaty feeling that the right eye does. Patient reports she is not currently taking any blood pressure medications. Patient currently had Bronchitis and is still on antibiotic regimen.       Review of patient's allergies indicates:   Allergen Reactions    Codeine Nausea Only     Past Medical History:   Diagnosis Date    Arthritis     COPD (chronic obstructive pulmonary disease)     Depression     GERD (gastroesophageal reflux disease)     History of colonic polyps     Hypertension     Hypothyroidism     Insomnia     Ulcer      Past Surgical History:   Procedure Laterality Date    BILATERAL VATS ABLATION  8/27/13    Dr. Parisi at  (benign)    CHOLECYSTECTOMY      COSMETIC SURGERY      FRACTURE SURGERY      LAPAROSCOPIC GASTRIC BANDING  12/27/07    APS lap band    ligation thumb repair       Family History   Problem Relation Age of Onset    Heart disease Mother     Cancer Father      Social History     Tobacco Use    Smoking status: Former Smoker     Packs/day: 0.50     Years: 30.00     Pack years: 15.00     Last attempt to quit: 8/10/2013     Years " since quittin.4    Smokeless tobacco: Never Used   Substance Use Topics    Alcohol use: No    Drug use: No     Review of Systems  Constitutional:  No Fever, No Chills,   Eyes: No Vision Changes. Floaty feeling in her left eye and eye pain.   ENT/Mouth: No sore throat, No rhinorrhea  Cardiovascular:  No Chest Pain, No Palpitations  Respiratory:  No Cough, No SOB  Gastrointestinal:  No Nausea, No Vomiting, No Diarrhea, No abdo pain.  Genitourinary:  No  pain, No dysuria   Musculoskeletal:  No Arthralgias, No Back Pain, No Neck Pain, No recent trauma.  Skin:  No skin Lesions  Neuro:  No Weakness, No Numbness, No Paresthesias, No Dizziness, Headache    Physical Exam     Initial Vitals [20 1419]   BP Pulse Resp Temp SpO2   (!) 183/97 81 16 98.3 °F (36.8 °C) 99 %      MAP       --         Physical Exam  Physical Exam:  GENERAL APPEARANCE: Well developed, well nourished, in no acute distress.  HENT: Normocephalic, atraumatic    EYES: Sclerae anicteric. PERRL. EOMI. No pink conjunctival injection. No foreign bodies seen in or around the eye. 20/25 on the R, 20/20 on the L.   NECK: Supple, no thyroid enlargement  CARDIOVASCULAR: Regular rate and rhythm without any murmurs, gallops, rubs.  LUNGS: Speaking in full sentences. Breathing comfortably. Auscultation of the lungs revealed normal breath sounds b/l  ABDOMEN: Soft and nontender, no masses, no rebound or guarding   NEUROLOGIC: Alert, interacting normally. No facial droop.   MSK: Moving all four extremities.  Skin: Warm and dry. No visible rash on exposed areas of skin.    Psych: Mood and affect normal.   Neuro: Finger to nose intact bilat and 5/5 S&S bilterally. Cranial nerves normal bilaterally.    ED Course   Procedures  Labs Reviewed - No data to display       Imaging Results    None          Medical Decision Making:   History:   Old Medical Records: I decided to obtain old medical records.  Initial Assessment:   Cataracts and Glaucoma surgery in late  December. Bilateral eyes now with severe pain behind her eye. Pain similar to pain before surgery. no focal Neuro deficits. No trauma. At this time this is no consistent with dangerous intercranial pathology. Will treat with tylenol and consult opthalmology for evaluation for possible recurrent glaucoma, stent displacement, or other dangerous eye pathology.  Clinical Tests:   Lab Tests: Reviewed and Ordered  ED Management:  Update:  Eye pressure on the right was 16 and on the left was 14 with me. Will give Motrin and continue to wait for Optho evaluation.     Update:   Labs benign with markers low.  Ophtho thinks pt pain 2/2 to iritis, sig improvement with ED meds.   She will follow with them promptly  Strict ED return instructions.     Findings of ED work up explained to patient. Patient agrees with discharge plan and verbalizes understanding of return precautions.       Other:   I have discussed this case with another health care provider.       <> Summary of the Discussion:   Discussed with Opthalmology. They will come see patient but have about an hour delay. Will check pressures now to ensure that they are not dangerously elevated.   See MDM            Scribe Attestation:   Scribe #1: I performed the above scribed service and the documentation accurately describes the services I performed. I attest to the accuracy of the note.    Attending Attestation:           Physician Attestation for Scribe:  Physician Attestation Statement for Scribe #1: I, Destin Brunson MD, reviewed documentation, as scribed by Edwin Fountain in my presence, and it is both accurate and complete.                               Clinical Impression:   No diagnosis found.                          Destin Brunson MD  01/20/20 [id number]

## 2020-01-19 NOTE — TELEPHONE ENCOUNTER
Had eye stent sx for glaucoma on 12/27/19 and 12/30/19-- taking pressure drops. Just moved here yesterday and was lifting and possibly violated activity rules. Headache on side of temple. And eye is fluttering. And severe eye pain.     Eye pressure prior to sx was 42.       . instructed pt to go to ED now. Friend will drive per pt report.         Reason for Disposition   Severe pain in one eye    Additional Information   Negative: Difficult to awaken or acting confused (e.g., disoriented, slurred speech)   Negative: Weakness of the face, arm or leg on one side of the body and new onset   Negative: Numbness of the face, arm or leg on one side of the body and new onset   Negative: Loss of speech or garbled speech and new onset   Negative: Passed out (i.e., fainted, collapsed and was not responding)   Negative: Sounds like a life-threatening emergency to the triager   Negative: Unable to walk without falling   Negative: SEVERE headache, sudden onset (i.e., reaching maximum intensity within 30 seconds)   Negative: SEVERE headache, states 'worst headache' of life   Negative: Possibility of carbon monoxide exposure   Negative: Stiff neck (can't touch chin to chest)    Protocols used: HEADACHE-A-OH

## 2020-01-19 NOTE — ED NOTES
LOC: The patient is awake, alert and aware of environment with an appropriate affect, the patient is oriented x 3 and speaking appropriately.  APPEARANCE: Patient resting comfortably and in no acute distress, patient is clean and well groomed, patient's clothing is properly fastened.  SKIN: The skin is warm and dry, color consistent with ethnicity, patient has normal skin turgor and moist mucus membranes, skin intact, no breakdown or bruising noted.  MUSCULOSKELETAL: Patient moving all extremities spontaneously, no obvious swelling or deformities noted.  RESPIRATORY: Airway is open and patent, respirations are spontaneous, patient has a normal effort and rate, no accessory muscle use noted.

## 2020-01-19 NOTE — ED TRIAGE NOTES
"Eye stent surgery on dec 27th and Dec 30.  Pt with right temporal headache that intially started yesterday , increased today.  Pt states that she "feels something" in her right eye.  Pt states vision has decreased over the past couple of days.  "

## 2020-01-19 NOTE — CONSULTS
Consultation Report  Ophthalmology Service    Date: 01/19/2020    Chief complaint/Reason for Consult: right side eye pain, recent glaucoma, and cataract surgery     History of Present Illness: Dora Vazquez is a 59 y.o. female who presents with right temporal headache with OD foreign body sensation.    Patient reported mild right sided headache since Friday (3 days ago) that had interval worsening today (10/10). She just moved from [place] to [place] today and had been moving stuff into her new house. Upon ED presentation, symptoms had interval improvement with Acetaminophen, Ibuprofen, and Metoclopramide. No OD discomfort off topical anesthetic.    She was diagnosed in [place] to have acute angle closure glaucoma OD. This resulted in prompt surgery OU.    S/p CEIOL w/ iStent, OD 12/27/2019 and OS 12/30/2019. Currently on Lumigan QHS and Pred Forte QD OU.    She reported some OD glare while driving at night. Patient currently denies any visual changes or disturbances, flashes or floaters, or ocular discomfort OU. Patient denies temporal tenderness and jaw claudication both sides.    Patient is concerned about acute angle closure attack OD.    POcularHx: as above, no other eye issues    Current eye gtts: Lumigan QHS OU, PredForte QD OU    PMHx:  has a past medical history of Arthritis, COPD (chronic obstructive pulmonary disease), Depression, GERD (gastroesophageal reflux disease), History of colonic polyps, Hypertension, Hypothyroidism, Insomnia, and Ulcer.     PSurgHx:  has a past surgical history that includes Cholecystectomy; Laparoscopic gastric banding (12/27/07); ligation thumb repair; Bilateral VATS ablation (8/27/13); Cosmetic surgery; and Fracture surgery.     Home Medications:   Prior to Admission medications    Medication Sig Start Date End Date Taking? Authorizing Provider   albuterol 90 mcg/actuation inhaler Inhale 2 puffs into the lungs every 4 (four) hours as needed for Wheezing. Rescue 12/10/17  1/9/18  Benjamín Mendoza MD   clonazePAM (KLONOPIN) 0.5 MG tablet Take 1 tablet (0.5 mg total) by mouth every evening. 5/22/18 6/21/18  Ezequiel Florian MD   cyanocobalamin (VITAMIN B-12) 1000 MCG tablet Take 100 mcg by mouth once daily.    Historical Provider, MD   hydroCHLOROthiazide (HYDRODIURIL) 25 MG tablet TAKE 1 TABLET (25 MG TOTAL) BY MOUTH ONCE DAILY. 9/17/18 9/17/19  Velvet Clark MD   hydrocodone-acetaminophen 7.5-325mg (NORCO) 7.5-325 mg per tablet Take 1 tablet by mouth every 6 (six) hours as needed for Pain. 3/15/18   Papi Jiang MD   traZODone (DESYREL) 150 MG tablet TAKE 1 TABLET (150 MG TOTAL) BY MOUTH NIGHTLY. 7/19/18   Sima Pearl NP   vitamin D 185 MG Tab Take 185 mg by mouth once daily.    Historical Provider, MD        Medications this encounter:     Allergies: is allergic to codeine.     Social:  reports that she quit smoking about 6 years ago. She has a 15.00 pack-year smoking history. She has never used smokeless tobacco. She reports that she does not drink alcohol or use drugs.     Family Hx: No family history of glaucoma, macular degeneration, or blindness per patient. family history includes Cancer in her father; Heart disease in her mother.     ROS: As per HPI    Ocular examination/Dilated fundus examination:  Base Eye Exam     Visual Acuity       Right Left    Near cc 20/20 20/20          Tonometry (Tonopen, 5:54 PM)       Right Left    Pressure 15 17          Gonioscopy       Right Left    Temporal TM TM    Nasal TM TM    Superior TM TM    Inferior TM TM   Unable to visualize iStents OU, attempted x3           Pupils       Pupils Dark Light Shape React APD    Right PERRL 4 2 Round Brisk None    Left PERRL 4 2 Round Brisk None          Visual Fields       Right Left     Full Full          Extraocular Movement       Right Left     Full, Ortho Full, Ortho            Additional Tests     Color       Right Left    Ishihara 16/16 16/16            Slit Lamp and Fundus Exam      External Exam       Right Left    External temporal artery pulse palpable, no tenderness on palpation temporal artery pulse palpable, no tenderness on palpation          Slit Lamp Exam       Right Left    Lids/Lashes Normal Normal    Conjunctiva/Sclera White and quiet White and quiet    Cornea clear corneal incisions 7:00 and 12:00, PEEs inferiorly clear corneal incisions 12:00 and 5:00, PEEs inferiorly    Anterior Chamber Trace Flare, Trace Cell Deep and quiet    Iris Round and reactive Round and reactive    Lens Posterior chamber intraocular lens, Posterior capsular opacification Posterior chamber intraocular lens    Vitreous Vitreous syneresis Vitreous syneresis          Fundus Exam       Right Left    Disc Normal Normal    C/D Ratio 0.2 0.15    Macula Normal Normal    Vessels Normal Normal    Periphery yellow pigmentation in shape of almond at 6:00 Normal                Assessment/Plan:   1. Rebound Iritis, OD, s/p CEIOL and iStent OD  - No concern for GCA given normal ESR, CRP, and CBC, normal external exam, no jaw claudication, and grossly normal fundus exam  - Start over the counter pain medications as needed  - Discontinue Lumigan right eye  - Start Pred Forte 4 times a day right eye  - Start Brimonidine 3 times a day right eye  - Continue Pred Forte once a day left eye eye  - Continue Lumigan once at night left eye  - General return precautions provided  - Patient to follow up with Glaucoma within 1 week, Ophthalmology will arrange, Patient's best phone number: [phone]    Destin Hernandez MD PGY-2  Ophthalmology Resident  01/19/2020  5:53 PM

## 2020-01-20 NOTE — DISCHARGE INSTRUCTIONS
Instructions from ophthalmology:  - Discontinue Lumigan right eye  - Start Pred Forte 4 times a day right eye  - Start Brimonidine 3 times a day right eye  - Continue Pred Forte once a day left eye eye  - Continue Lumigan once at night left eye  - General return precautions provided  - Patient to follow up with Glaucoma within 1 week, Ophthalmology will arrange, Patient's best phone number: [phone]    If you develop worsening or recurrence severe abdominal pain, vision loss, fevers, neurologic symptoms such as weakness, numbness, trouble walking, confusion or any other new, worsening or worrisome symptoms please return to the emergency department for re-evaluation.

## 2020-01-21 ENCOUNTER — OFFICE VISIT (OUTPATIENT)
Dept: OPHTHALMOLOGY | Facility: CLINIC | Age: 60
End: 2020-01-21
Payer: COMMERCIAL

## 2020-01-21 DIAGNOSIS — Z96.1 PSEUDOPHAKIA OF BOTH EYES: ICD-10-CM

## 2020-01-21 DIAGNOSIS — H40.2294 CHRONIC ANGLE-CLOSURE GLAUCOMA, INDETERMINATE STAGE, UNSPECIFIED LATERALITY: Primary | ICD-10-CM

## 2020-01-21 DIAGNOSIS — H20.041: ICD-10-CM

## 2020-01-21 PROBLEM — H20.9 UVEITIS: Status: ACTIVE | Noted: 2020-01-21

## 2020-01-21 PROCEDURE — 92020 GONIOSCOPY: CPT | Mod: S$GLB,,, | Performed by: OPHTHALMOLOGY

## 2020-01-21 PROCEDURE — 92004 PR EYE EXAM, NEW PATIENT,COMPREHESV: ICD-10-PCS | Mod: S$GLB,,, | Performed by: OPHTHALMOLOGY

## 2020-01-21 PROCEDURE — 99999 PR PBB SHADOW E&M-EST. PATIENT-LVL II: CPT | Mod: PBBFAC,,, | Performed by: OPHTHALMOLOGY

## 2020-01-21 PROCEDURE — 92004 COMPRE OPH EXAM NEW PT 1/>: CPT | Mod: S$GLB,,, | Performed by: OPHTHALMOLOGY

## 2020-01-21 PROCEDURE — 92020 PR SPECIAL EYE EVAL,GONIOSCOPY: ICD-10-PCS | Mod: S$GLB,,, | Performed by: OPHTHALMOLOGY

## 2020-01-21 PROCEDURE — 92250 FUNDUS PHOTOGRAPHY W/I&R: CPT | Mod: S$GLB,,, | Performed by: OPHTHALMOLOGY

## 2020-01-21 PROCEDURE — 92250 COLOR FUNDUS PHOTOGRAPHY - OU - BOTH EYES: ICD-10-PCS | Mod: S$GLB,,, | Performed by: OPHTHALMOLOGY

## 2020-01-21 PROCEDURE — 99999 PR PBB SHADOW E&M-EST. PATIENT-LVL II: ICD-10-PCS | Mod: PBBFAC,,, | Performed by: OPHTHALMOLOGY

## 2020-01-21 RX ORDER — CLONAZEPAM 0.5 MG/1
0.5 TABLET ORAL 2 TIMES DAILY PRN
COMMUNITY

## 2020-01-21 NOTE — PROGRESS NOTES
HPI     Pt moved here from [place]. Pt states she went to the ER on the 19th due   to the OD feeling like something is in it and had a terrible headache. Pt   states she is establishing care for her glaucoma. Pt states she had   glaucoma and cataract surgery in OU done in [place] OD done on December 27th and OS done December 28th. Pt     Meds: Lumigan qhs os             PA qid od             PA qday os             Brimonidine tid od    Last edited by Rama Terrazas on 1/21/2020  3:37 PM. (History)          Assessment /Plan     For exam results, see Encounter Report.    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   First photos   1/2020   Treatment / Drops started   11/2019            Family history    none        Glaucoma meds   lumigan ou // brimonidine od  - started 1/2020 - at ED visit for possible post op iritis         H/O adverse rxn to glaucoma drops    Intol to generic lumigan / ?? Other intolerances          LASERS    None (no PI's)         GLAUCOMA SURGERIES    Phaco/IOL for phacomorphic closure and I stents at same time (12/2019)         OTHER EYE SURGERIES    Cataract surgery and istent right eye (12/2019 - [facility])         CDR    0.5 / 0.3        Tbase    btw 26 and 40 pre cataract surgery         Tmax    40/40         Ttarget    ????             HVF    ????        Gonio    + 1-2 w/ 2 I tents // +1-2 with 1 I stent         CCT    523/514        OCT    ????        HRT   ????        Disc photos  2020    - Ttoday    15/14  - Test done today    IOP/gonio/DFE    PT moved from [place], [place] was dx with POAG OU in Nov 2019. PT received PCIOL OU with istent injection in both eyes. Pt has istent (2) in right eye and (1) istent in left eye. Pt has SA 60 Yasmany lenses in both eyes. PT here to establish care.      2. Dry eyes    Exacerbated by a 4 lid blepharoplasty    rec AT's 4- 8 x day     3. PC IOL OU -    12/2019 - [place]    Laser assisted  / laser relaxing incision and I stents (angle narrow)     4.  Rebound Iritis, OD, s/p CEIOL and iStent x 2 OD  - off Lumigan right eye  - start taper of Pred Forte  - decrease to bid od   -cont pred forte os 1 x day   -cont lumigan os 1 x day   - Brimonidine 3 times a day right eye    5. H/O  Bilateral upper and lower blepharoplasty    Has been having more dry eye symptoms since    Suggest using AT's and if needed ointment at night        Photos today     F/u 1 - 2 months with HVF / OCT - may have to hold off on PG's od for a while - 2/2 rebound iritis       Addendum - 3/23/3030  Pt recently had a steroid injection on 2/24/2020, and then took PO prednisone for 10 days starting 3/4/2020  Pt was suppose to come in - but she has a fever and we are presently under visit restrictions for the corona virus   She is anxious about possible IOP spike from steroids    Pt has a H/O iritis and is actually already  on topical steroid gtts in both eyes - pred forte ou q day   So she may or may not be a steroid responder - but she has already been exposed to ongoing  low dose topical steroids   Need to continue to avoid PG gtts   Pt presently with a bronchitis like syndrome - so BB are best avoided  Can call in a topical NAHEED - dorzolamide tid ou - warned that it burns more than other gtts on instillation     Pt is to call and come in for iritis and IOP check as soon as she is afebrile and - for a urgent / semi-emergent visit. Will try to get her in within the next 1-2 weeks if possible

## 2020-02-27 ENCOUNTER — OFFICE VISIT (OUTPATIENT)
Dept: URGENT CARE | Facility: CLINIC | Age: 60
End: 2020-02-27
Payer: COMMERCIAL

## 2020-02-27 VITALS
WEIGHT: 126 LBS | BODY MASS INDEX: 20.99 KG/M2 | OXYGEN SATURATION: 98 % | TEMPERATURE: 98 F | HEART RATE: 76 BPM | HEIGHT: 65 IN | SYSTOLIC BLOOD PRESSURE: 164 MMHG | RESPIRATION RATE: 16 BRPM | DIASTOLIC BLOOD PRESSURE: 92 MMHG

## 2020-02-27 DIAGNOSIS — J20.9 ACUTE PURULENT BRONCHITIS: Primary | ICD-10-CM

## 2020-02-27 DIAGNOSIS — J98.01 ACUTE BRONCHOSPASM: ICD-10-CM

## 2020-02-27 PROCEDURE — 96372 PR INJECTION,THERAP/PROPH/DIAG2ST, IM OR SUBCUT: ICD-10-PCS | Mod: S$GLB,,, | Performed by: INTERNAL MEDICINE

## 2020-02-27 PROCEDURE — 99214 OFFICE O/P EST MOD 30 MIN: CPT | Mod: 25,S$GLB,, | Performed by: INTERNAL MEDICINE

## 2020-02-27 PROCEDURE — 99214 PR OFFICE/OUTPT VISIT, EST, LEVL IV, 30-39 MIN: ICD-10-PCS | Mod: 25,S$GLB,, | Performed by: INTERNAL MEDICINE

## 2020-02-27 PROCEDURE — 96372 THER/PROPH/DIAG INJ SC/IM: CPT | Mod: S$GLB,,, | Performed by: INTERNAL MEDICINE

## 2020-02-27 RX ORDER — BETAMETHASONE SODIUM PHOSPHATE AND BETAMETHASONE ACETATE 3; 3 MG/ML; MG/ML
9 INJECTION, SUSPENSION INTRA-ARTICULAR; INTRALESIONAL; INTRAMUSCULAR; SOFT TISSUE ONCE
Status: COMPLETED | OUTPATIENT
Start: 2020-02-27 | End: 2020-02-27

## 2020-02-27 RX ORDER — AZITHROMYCIN 250 MG/1
TABLET, FILM COATED ORAL
Qty: 6 TABLET | Refills: 0 | Status: SHIPPED | OUTPATIENT
Start: 2020-02-27 | End: 2020-04-24 | Stop reason: ALTCHOICE

## 2020-02-27 RX ORDER — BENZONATATE 100 MG/1
100 CAPSULE ORAL EVERY 6 HOURS PRN
Qty: 30 CAPSULE | Refills: 1 | Status: SHIPPED | OUTPATIENT
Start: 2020-02-27 | End: 2020-04-24 | Stop reason: ALTCHOICE

## 2020-02-27 RX ADMIN — BETAMETHASONE SODIUM PHOSPHATE AND BETAMETHASONE ACETATE 9 MG: 3; 3 INJECTION, SUSPENSION INTRA-ARTICULAR; INTRALESIONAL; INTRAMUSCULAR; SOFT TISSUE at 10:02

## 2020-02-27 NOTE — PROGRESS NOTES
"Subjective:       Patient ID: Dora Vazquez is a 59 y.o. female.    Vitals:  height is 5' 5" (1.651 m) and weight is 57.2 kg (126 lb). Her temperature is 98 °F (36.7 °C). Her blood pressure is 164/92 (abnormal) and her pulse is 76. Her respiration is 16 and oxygen saturation is 98%.     Chief Complaint: Cough    Pt states she just moved to [place] from [place] and has had congestion and rhinorrhea for 4 days.    Cough   This is a new problem. The current episode started in the past 7 days. The problem has been unchanged. The problem occurs constantly. The cough is productive of purulent sputum. Associated symptoms include chills, headaches, nasal congestion and rhinorrhea. Pertinent negatives include no ear pain, eye redness, fever, hemoptysis, myalgias, rash, sore throat, shortness of breath or wheezing. Nothing aggravates the symptoms. She has tried oral steroids for the symptoms. The treatment provided no relief. Her past medical history is significant for bronchitis and COPD.       Constitution: Positive for chills. Negative for sweating, fatigue and fever.   HENT: Positive for congestion. Negative for ear pain, sinus pain, sinus pressure, sore throat and voice change.    Neck: Negative for painful lymph nodes.   Eyes: Negative for eye redness.   Respiratory: Positive for cough and sputum production. Negative for chest tightness, bloody sputum, COPD, shortness of breath, stridor, wheezing and asthma.    Gastrointestinal: Negative for nausea and vomiting.   Musculoskeletal: Negative for muscle ache.   Skin: Negative for rash.   Allergic/Immunologic: Negative for seasonal allergies and asthma.   Neurological: Positive for headaches.   Hematologic/Lymphatic: Negative for swollen lymph nodes.       Objective:      Physical Exam   Constitutional: She appears well-developed and well-nourished.   HENT:   Head: Normocephalic and atraumatic.   Eyes: Pupils are equal, round, and reactive to light. Conjunctivae and " EOM are normal.   Neck: Normal range of motion. Neck supple.   Cardiovascular: Normal rate and regular rhythm.   Pulmonary/Chest: Effort normal. She has wheezes.   DIFFUSE UPPER AIRWAY CONGESTION   Nursing note and vitals reviewed.        Assessment:       No diagnosis found.    Plan:         There are no diagnoses linked to this encounter.

## 2020-03-11 ENCOUNTER — TELEPHONE (OUTPATIENT)
Dept: OPHTHALMOLOGY | Facility: CLINIC | Age: 60
End: 2020-03-11

## 2020-03-19 ENCOUNTER — TELEPHONE (OUTPATIENT)
Dept: OPHTHALMOLOGY | Facility: CLINIC | Age: 60
End: 2020-03-19

## 2020-03-19 RX ORDER — BIMATOPROST 0.1 MG/ML
1 SOLUTION/ DROPS OPHTHALMIC NIGHTLY
COMMUNITY
Start: 2020-03-07 | End: 2020-04-13 | Stop reason: SDUPTHER

## 2020-03-19 NOTE — TELEPHONE ENCOUNTER
Spoke to caryn at [facility], [phone] and ok'pamela 3 refills w/90 day supply for brimonidine, lumigan, and CECY

## 2020-03-20 ENCOUNTER — TELEPHONE (OUTPATIENT)
Dept: OPHTHALMOLOGY | Facility: CLINIC | Age: 60
End: 2020-03-20

## 2020-03-20 NOTE — TELEPHONE ENCOUNTER
Pt has appt on 3/23/20.  reviewed and still want to see pt. I left a message for pt to see if she still wants to come in or r/s appt.

## 2020-03-23 ENCOUNTER — TELEPHONE (OUTPATIENT)
Dept: OPHTHALMOLOGY | Facility: CLINIC | Age: 60
End: 2020-03-23

## 2020-03-23 RX ORDER — DORZOLAMIDE HCL 20 MG/ML
1 SOLUTION/ DROPS OPHTHALMIC 3 TIMES DAILY
Qty: 10 ML | Refills: 3 | Status: SHIPPED | OUTPATIENT
Start: 2020-03-23 | End: 2020-07-27

## 2020-03-23 NOTE — TELEPHONE ENCOUNTER
Pt called and stated she had to cancel her appt. Today due having off and on fevers and was told that she may have corona virus per her via doctor but cannot be tested at this time due to not having all the symptoms pt stated she is still having off and on fevers this morning and wanted to know if she still should come in. I informed the patient if her doctor told her that she needed to be isolated then she should stay home and that they do take your temperature before entering the building and for her safety and others that she should not come in and that we can reschedule her appointment to a later date. Pt then hung up.

## 2020-04-13 ENCOUNTER — PATIENT MESSAGE (OUTPATIENT)
Dept: OPHTHALMOLOGY | Facility: CLINIC | Age: 60
End: 2020-04-13

## 2020-04-13 RX ORDER — BIMATOPROST 0.1 MG/ML
1 SOLUTION/ DROPS OPHTHALMIC NIGHTLY
Qty: 1 BOTTLE | Refills: 3 | Status: SHIPPED | OUTPATIENT
Start: 2020-04-13 | End: 2020-08-28

## 2020-04-24 ENCOUNTER — OFFICE VISIT (OUTPATIENT)
Dept: OPHTHALMOLOGY | Facility: CLINIC | Age: 60
End: 2020-04-24
Payer: COMMERCIAL

## 2020-04-24 DIAGNOSIS — Z98.890 S/P BILATERAL BLEPHAROPLASTY: ICD-10-CM

## 2020-04-24 DIAGNOSIS — Z96.1 PSEUDOPHAKIA OF BOTH EYES: ICD-10-CM

## 2020-04-24 DIAGNOSIS — H40.2294 CHRONIC ANGLE-CLOSURE GLAUCOMA, INDETERMINATE STAGE, UNSPECIFIED LATERALITY: Primary | ICD-10-CM

## 2020-04-24 DIAGNOSIS — H20.041: ICD-10-CM

## 2020-04-24 DIAGNOSIS — H04.123 DRY EYES: ICD-10-CM

## 2020-04-24 PROCEDURE — 92012 INTRM OPH EXAM EST PATIENT: CPT | Mod: S$GLB,,, | Performed by: OPHTHALMOLOGY

## 2020-04-24 PROCEDURE — 92012 PR EYE EXAM, EST PATIENT,INTERMED: ICD-10-PCS | Mod: S$GLB,,, | Performed by: OPHTHALMOLOGY

## 2020-04-24 PROCEDURE — 99999 PR PBB SHADOW E&M-EST. PATIENT-LVL II: CPT | Mod: PBBFAC,,, | Performed by: OPHTHALMOLOGY

## 2020-04-24 PROCEDURE — 99999 PR PBB SHADOW E&M-EST. PATIENT-LVL II: ICD-10-PCS | Mod: PBBFAC,,, | Performed by: OPHTHALMOLOGY

## 2020-04-24 RX ORDER — PREDNISOLONE ACETATE 10 MG/ML
1 SUSPENSION/ DROPS OPHTHALMIC DAILY
Qty: 1 BOTTLE | Refills: 1 | Status: SHIPPED | OUTPATIENT
Start: 2020-04-24 | End: 2020-04-24 | Stop reason: SDUPTHER

## 2020-04-24 RX ORDER — CYCLOSPORINE 0.5 MG/ML
1 EMULSION OPHTHALMIC 2 TIMES DAILY
Qty: 60 EACH | Refills: 12 | Status: SHIPPED | OUTPATIENT
Start: 2020-04-24 | End: 2020-08-28 | Stop reason: SDUPTHER

## 2020-04-24 RX ORDER — PREDNISOLONE ACETATE 10 MG/ML
1 SUSPENSION/ DROPS OPHTHALMIC DAILY
Qty: 1 BOTTLE | Refills: 1 | Status: SHIPPED | OUTPATIENT
Start: 2020-04-24 | End: 2020-07-27

## 2020-04-24 NOTE — PROGRESS NOTES
HPI     Glaucoma      Additional comments: IOP ck today               Eye Problem      Additional comments: Pt c/o redness and swollen eyes but no pain              Comments     DLS: 1/21/20    1. Chronic Angle Closure / Phacomorphic  2. LUBNA  3. Rebound Iritis OD  4. PCIOL OU  5. Hx Bilateral Blepharoplasty    MEDS:  Brimonidine BID OD   PA BID OU  Dorzolamide TID OU = ONLY USES BID OU  Lumigan QHS OS          Last edited by Daisha Harmon MA on 4/24/2020  9:36 AM. (History)            Assessment /Plan     For exam results, see Encounter Report.    Chronic angle-closure glaucoma, indeterminate stage, unspecified laterality    Secondary iritis of right eye    Dry eyes    Pseudophakia of both eyes    S/p bilateral blepharoplasty         1.   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   First photos   1/2020   Treatment / Drops started   11/2019            Family history    none        Glaucoma meds   lumigan ou // brimonidine od  - started 1/2020 - at ED visit for possible post op iritis         H/O adverse rxn to glaucoma drops    Intol to generic lumigan / ?? Other intolerances          LASERS    None (no PI's)         GLAUCOMA SURGERIES    Phaco/IOL for phacomorphic closure and I stents at same time (12/2019)         OTHER EYE SURGERIES    [facility]         CDR    0.5 / 0.3        Tbase    btw 26 and 40 pre cataract surgery         Tmax    40/40         Ttarget    ????             HVF    ????        Gonio    + 1-2 w/ 2 I tents // +1-2 with 1 I stent         CCT    523/514        OCT    ????        HRT   ????        Disc photos  2020    - Ttoday    12/15  - Test done today    IOP/iritis check     PT moved from [place], [place] was dx with POAG OU in Nov 2019. PT received PCIOL OU with istent injection in both eyes. Pt has istent (2) in right eye and (1) istent in left eye. Pt has SA 60 Yasmany lenses in both eyes. PT here to  establish care. (1/21/2020)     2. Dry eyes    Exacerbated by a 4 lid blepharoplasty    rec AT's 4- 8 x day     3. PC IOL OU -    12/2019 - [place]    Laser assisted  / laser relaxing incision and I stents (angle narrow)     4.  Rebound Iritis, OD, s/p CEIOL and iStent x 2 OD  - off Lumigan right eye  - cont w/  taper of Pred Forte  - decrease to q day - using ou   -cont lumigan os 1 x day - when runs out can switch to brimonidine bid os (already using od)   - Brimonidine 3 times a day right eye (only using bid)   -dorzolamide - tid ou (called in because pt had to get a steroid injection and she was worried the IOP might go up) - only using bid     5. H/O  Bilateral upper and lower blepharoplasty    Has been having more dry eye symptoms since    Suggest using AT's and if needed ointment at night       Addendum - 3/23/2020  Pt recently had a steroid injection on 2/24/2020, and then took PO prednisone for 10 days starting 3/4/2020  Pt was suppose to come in - but she has a fever and we are presently under visit restrictions for the corona virus   She is anxious about possible IOP spike from steroids    Pt has a H/O iritis and is actually already  on topical steroid gtts in both eyes - pred forte ou bid   So she may or may not be a steroid responder - but she has already been exposed to ongoing  low dose topical steroids   Need to continue to avoid PG gtts - especially od   H/O bronchitis like illness - so BB may or may not be an option     Adjust gtts (4/24/2020)   IOP ok and iritis minimal ou   pred acetate - decrease from 2 x day ou to 1 x day ou (using long term for persistent mild iritis)   Brimonidine - od cont bid (once the lumigan os runs out can switch to brimonidine ou - to keep both eyes same)   Dorzolamide ou bid - tolerating ok   Lumigan os q hs - once bottle runs out, can switch  to brimonidine ou  (this will keep regiment same in both eyes and pt with a H/O rebound / prolonged iritis)   STOP - avoid  latisse - has used in past to make lashes grow - this is likely to aggravate the iritis     Cont AT's ou 2-8 x day as needed   Ointment at night prn   Add trial of restasis ou bid   If the dry eyes worsen - consider consult to Geo or Christine  Do not feel revision of blepharoplasty necessary at this time - but if dry eye symptoms worsen and can not be managed with lubrication - this might be an option  Pt has used puntal plugs in past - did not feel they helped      F/U 3-4  months with HVF / OCT // if AC quiet ou - can try  Tapering off steroid gtts     Refer to optometry in [facility] for glasses - wants to see if can help with night driving and also needs for reading

## 2020-05-18 ENCOUNTER — OFFICE VISIT (OUTPATIENT)
Dept: OPTOMETRY | Facility: CLINIC | Age: 60
End: 2020-05-18
Payer: COMMERCIAL

## 2020-05-18 DIAGNOSIS — H40.1134 CHRONIC OPEN ANGLE GLAUCOMA OF BOTH EYES, INDETERMINATE STAGE: ICD-10-CM

## 2020-05-18 DIAGNOSIS — H26.493 CLOUDY POSTERIOR CAPSULE, BILATERAL: Primary | ICD-10-CM

## 2020-05-18 PROCEDURE — 92012 INTRM OPH EXAM EST PATIENT: CPT | Mod: S$GLB,,, | Performed by: OPTOMETRIST

## 2020-05-18 PROCEDURE — 92015 DETERMINE REFRACTIVE STATE: CPT | Mod: S$GLB,,, | Performed by: OPTOMETRIST

## 2020-05-18 PROCEDURE — 92015 PR REFRACTION: ICD-10-PCS | Mod: S$GLB,,, | Performed by: OPTOMETRIST

## 2020-05-18 PROCEDURE — 92012 PR EYE EXAM, EST PATIENT,INTERMED: ICD-10-PCS | Mod: S$GLB,,, | Performed by: OPTOMETRIST

## 2020-05-18 PROCEDURE — 99999 PR PBB SHADOW E&M-EST. PATIENT-LVL II: ICD-10-PCS | Mod: PBBFAC,,, | Performed by: OPTOMETRIST

## 2020-05-18 PROCEDURE — 99999 PR PBB SHADOW E&M-EST. PATIENT-LVL II: CPT | Mod: PBBFAC,,, | Performed by: OPTOMETRIST

## 2020-05-18 NOTE — PROGRESS NOTES
HPI     DLS  04/24/2020--Dr Valladares  Patient here for IOP ck/ and Rx for glasses.  Patient states she experience depth perception being off, and night   driving becoming difficult.  HA+ comes/goes  DIPLOPIA-  PHOTOPHOBIA-  FLASHES-  FLOATERS-  PAIN+ discomfort OD    EYE MEDS  Brimonidine Bid OD  Dorzolamide TID OU  PA BID OU    Last edited by Alexx Shaw, OD on 5/18/2020  9:19 AM. (History)        ROS     Positive for: Eyes (CAT SURGERY/GLAUC SURGERY/Blepharoplasty)    Negative for: Constitutional, Gastrointestinal, Neurological, Skin,   Genitourinary, Musculoskeletal, HENT, Endocrine, Cardiovascular,   Respiratory, Psychiatric, Allergic/Imm, Heme/Lymph    Last edited by Alexx Shaw, OD on 5/18/2020  9:20 AM. (History)        Assessment /Plan     For exam results, see Encounter Report.    Cloudy posterior capsule, bilateral    Chronic open angle glaucoma of both eyes, indeterminate stage      1. Sp blepharoplasty OU  2. Mild pco OS>OD sp pciol OU.  Not ready for YAG, but getting close OS  3. Hx glaucoma surgery--followed by Dr Valladares.  iop stable on meds  4. Pt wishes new spex Rx--advised PALs    PLAN:    1. Cont glauc meds as prescribed  2. rtc as sched w Dr Valladares

## 2020-08-27 NOTE — PROGRESS NOTES
HPI     Glaucoma      Additional comments: HVF and OCT review today              Eye Problem      Additional comments: Pt feels eyes always look tired              Comments     DLS: 4/24/20    1. Chronic Angle Closure / Phacomorphic  2. LUBNA  3. Rebound Iritis OD  4. PCIOL OU  5. Hx Bilateral Blepharoplasty    MEDS:  PA q day  OU  Brimonidine BID OU = NOT USING TID  Dorzolamide  BID OU =NOT USING TID  Restasis BID OU = NOT USING AT ALL            Last edited by Josefa Valladares MD on 8/28/2020 11:01 AM. (History)            Assessment /Plan     For exam results, see Encounter Report.    Chronic angle-closure glaucoma, indeterminate stage, unspecified laterality  -     Posterior Segment OCT Optic Nerve- Both eyes  -     Martinez Visual Field - OU - Extended - Both Eyes    Secondary iritis of right eye    Dry eyes    Pseudophakia of both eyes    S/p bilateral blepharoplasty       1.   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   First photos   1/2020   Treatment / Drops started   11/2019            Family history    none        Glaucoma meds   lumigan ou // brimonidine od  - started 1/2020 - at ED visit for possible post op iritis         H/O adverse rxn to glaucoma drops    Intol to generic lumigan / ?? Other intolerances          LASERS    None (no PI's)         GLAUCOMA SURGERIES    Phaco/IOL for phacomorphic closure and I stents at same time (12/2019)         OTHER EYE SURGERIES    Cataract surgery and istent right eye (12/2019 - [facility])         CDR    0.5 / 0.3        Tbase    btw 26 and 40 pre cataract surgery         Tmax    40/40         Ttarget    ????             HVF    1 test 2020 to 2020 - SNS od // full os         Gonio    + 1-2 w/ 2 I tents // +1-2 with 1 I stent         CCT    523/514        OCT    1 test 2020 to 2020 - RNFL - dec. G/TI, bord N/NI/TS od // nl os         HRT   ????        Disc photos  2020    - Ttoday    14/13  - Test done  today    IOP/ HVF / OCT     PT moved from [place], [place] was dx with POAG OU in Nov 2019. PT received PCIOL OU with istent injection in both eyes. Pt has istent (2) in right eye and (1) istent in left eye. Pt has SA 60 Yasmany lenses in both eyes. PT here to establish care. (1/21/2020)     2. Dry eyes    Exacerbated by a 4 lid blepharoplasty    rec AT's 4- 8 x day     3. PC IOL OU -    12/2019 - [place]    Laser assisted  / laser relaxing incision and I stents (angle narrow)     4.  Rebound Iritis, OD, s/p CEIOL and iStent x 2 OD  - off Lumigan right eye  - cont w/  taper of Pred Forte  - decrease to q day - using ou   -cont lumigan os 1 x day - when runs out can switch to brimonidine bid os (already using od)   - Brimonidine 3 times a day right eye (only using bid)   -dorzolamide - tid ou (called in because pt had to get a steroid injection and she was worried the IOP might go up) - only using bid     5. H/O  Bilateral upper and lower blepharoplasty    Has been having more dry eye symptoms since    Suggest using AT's and if needed ointment at night           Plan   Cont brimonidine bid ou - (written cor tid)   Cont dorzolamide bid (written for tid )   restasis bid ou (Rx sent again )   Pres free AT's 2-6 x day as needed   Decrease Pred acetate to 1 drop both eyes 3 x week - Monday / Wednesday and Friday     Glasses - woody 5/18/2019     F/U 4  months with  HRT

## 2020-08-28 ENCOUNTER — OFFICE VISIT (OUTPATIENT)
Dept: OPHTHALMOLOGY | Facility: CLINIC | Age: 60
End: 2020-08-28
Payer: COMMERCIAL

## 2020-08-28 ENCOUNTER — CLINICAL SUPPORT (OUTPATIENT)
Dept: OPHTHALMOLOGY | Facility: CLINIC | Age: 60
End: 2020-08-28
Payer: COMMERCIAL

## 2020-08-28 DIAGNOSIS — M35.01 KCS (KERATOCONJUNCTIVITIS SICCA): ICD-10-CM

## 2020-08-28 DIAGNOSIS — H04.123 DRY EYES: ICD-10-CM

## 2020-08-28 DIAGNOSIS — H40.2212 CHRONIC ANGLE-CLOSURE GLAUCOMA, RIGHT, MODERATE STAGE: Primary | ICD-10-CM

## 2020-08-28 DIAGNOSIS — Z96.1 PSEUDOPHAKIA OF BOTH EYES: ICD-10-CM

## 2020-08-28 DIAGNOSIS — H40.2221 CHRONIC ANGLE-CLOSURE GLAUCOMA OF EYE, LEFT, MILD STAGE: ICD-10-CM

## 2020-08-28 DIAGNOSIS — Z98.890 S/P BILATERAL BLEPHAROPLASTY: ICD-10-CM

## 2020-08-28 DIAGNOSIS — H20.043: ICD-10-CM

## 2020-08-28 PROCEDURE — 99999 PR PBB SHADOW E&M-EST. PATIENT-LVL III: CPT | Mod: PBBFAC,,, | Performed by: OPHTHALMOLOGY

## 2020-08-28 PROCEDURE — 99999 PR PBB SHADOW E&M-EST. PATIENT-LVL III: ICD-10-PCS | Mod: PBBFAC,,, | Performed by: OPHTHALMOLOGY

## 2020-08-28 PROCEDURE — 92012 PR EYE EXAM, EST PATIENT,INTERMED: ICD-10-PCS | Mod: S$GLB,,, | Performed by: OPHTHALMOLOGY

## 2020-08-28 PROCEDURE — 92083 EXTENDED VISUAL FIELD XM: CPT | Mod: S$GLB,,, | Performed by: OPHTHALMOLOGY

## 2020-08-28 PROCEDURE — 92133 POSTERIOR SEGMENT OCT OPTIC NERVE(OCULAR COHERENCE TOMOGRAPHY) - OU - BOTH EYES: ICD-10-PCS | Mod: S$GLB,,, | Performed by: OPHTHALMOLOGY

## 2020-08-28 PROCEDURE — 92083 HUMPHREY VISUAL FIELD - OU - BOTH EYES: ICD-10-PCS | Mod: S$GLB,,, | Performed by: OPHTHALMOLOGY

## 2020-08-28 PROCEDURE — 92133 CPTRZD OPH DX IMG PST SGM ON: CPT | Mod: S$GLB,,, | Performed by: OPHTHALMOLOGY

## 2020-08-28 PROCEDURE — 92012 INTRM OPH EXAM EST PATIENT: CPT | Mod: S$GLB,,, | Performed by: OPHTHALMOLOGY

## 2020-08-28 RX ORDER — MELOXICAM 7.5 MG/1
7.5 TABLET ORAL DAILY
COMMUNITY
Start: 2020-06-22 | End: 2021-11-05

## 2020-08-28 RX ORDER — PREDNISOLONE ACETATE 10 MG/ML
1 SUSPENSION/ DROPS OPHTHALMIC
Qty: 5 ML | Refills: 6 | Status: SHIPPED | OUTPATIENT
Start: 2020-08-28 | End: 2020-12-11 | Stop reason: SDUPTHER

## 2020-08-28 RX ORDER — BRIMONIDINE TARTRATE 2 MG/ML
1 SOLUTION/ DROPS OPHTHALMIC 3 TIMES DAILY
Qty: 30 ML | Refills: 3 | Status: SHIPPED | OUTPATIENT
Start: 2020-08-28 | End: 2020-12-11 | Stop reason: SDUPTHER

## 2020-08-28 RX ORDER — CYCLOSPORINE 0.5 MG/ML
1 EMULSION OPHTHALMIC 2 TIMES DAILY
Qty: 60 EACH | Refills: 12 | Status: SHIPPED | OUTPATIENT
Start: 2020-08-28 | End: 2021-08-28

## 2020-08-28 NOTE — PROGRESS NOTES
24-2  SS DONE OU     REL & FIX =  GOOD OU       COOP =   GOOD     PATIENT DENIES ANY ALLERGIES TO LATEX/ADHESIVES AT THIS TIME    JTHOMAS    MRX     -1.00 + .75 X 20  OD  -.75 + .50 X 165  OS

## 2020-11-12 ENCOUNTER — PATIENT MESSAGE (OUTPATIENT)
Dept: OPHTHALMOLOGY | Facility: CLINIC | Age: 60
End: 2020-11-12

## 2020-12-11 ENCOUNTER — OFFICE VISIT (OUTPATIENT)
Dept: OPHTHALMOLOGY | Facility: CLINIC | Age: 60
End: 2020-12-11
Payer: COMMERCIAL

## 2020-12-11 DIAGNOSIS — H20.043: ICD-10-CM

## 2020-12-11 DIAGNOSIS — H40.2221 CHRONIC ANGLE-CLOSURE GLAUCOMA OF EYE, LEFT, MILD STAGE: Primary | ICD-10-CM

## 2020-12-11 DIAGNOSIS — H40.2212 CHRONIC ANGLE-CLOSURE GLAUCOMA, RIGHT, MODERATE STAGE: ICD-10-CM

## 2020-12-11 DIAGNOSIS — H40.2294 CHRONIC ANGLE-CLOSURE GLAUCOMA, INDETERMINATE STAGE, UNSPECIFIED LATERALITY: ICD-10-CM

## 2020-12-11 DIAGNOSIS — H04.123 DRY EYES: ICD-10-CM

## 2020-12-11 PROCEDURE — 92012 INTRM OPH EXAM EST PATIENT: CPT | Mod: S$GLB,,, | Performed by: OPHTHALMOLOGY

## 2020-12-11 PROCEDURE — 92133 HEIDELBERG RETINA TOMOGRAPHY (HRT) - OU - BOTH EYES: ICD-10-PCS | Mod: S$GLB,,, | Performed by: OPHTHALMOLOGY

## 2020-12-11 PROCEDURE — 92133 CPTRZD OPH DX IMG PST SGM ON: CPT | Mod: S$GLB,,, | Performed by: OPHTHALMOLOGY

## 2020-12-11 PROCEDURE — 1126F AMNT PAIN NOTED NONE PRSNT: CPT | Mod: S$GLB,,, | Performed by: OPHTHALMOLOGY

## 2020-12-11 PROCEDURE — 1126F PR PAIN SEVERITY QUANTIFIED, NO PAIN PRESENT: ICD-10-PCS | Mod: S$GLB,,, | Performed by: OPHTHALMOLOGY

## 2020-12-11 PROCEDURE — 92012 PR EYE EXAM, EST PATIENT,INTERMED: ICD-10-PCS | Mod: S$GLB,,, | Performed by: OPHTHALMOLOGY

## 2020-12-11 PROCEDURE — 99999 PR PBB SHADOW E&M-EST. PATIENT-LVL II: ICD-10-PCS | Mod: PBBFAC,,, | Performed by: OPHTHALMOLOGY

## 2020-12-11 PROCEDURE — 99999 PR PBB SHADOW E&M-EST. PATIENT-LVL II: CPT | Mod: PBBFAC,,, | Performed by: OPHTHALMOLOGY

## 2020-12-11 RX ORDER — BRIMONIDINE TARTRATE 2 MG/ML
1 SOLUTION/ DROPS OPHTHALMIC 3 TIMES DAILY
Qty: 30 ML | Refills: 3 | Status: SHIPPED | OUTPATIENT
Start: 2020-12-11

## 2020-12-11 RX ORDER — DORZOLAMIDE HCL 20 MG/ML
1 SOLUTION/ DROPS OPHTHALMIC 3 TIMES DAILY
Qty: 30 ML | Refills: 3 | Status: SHIPPED | OUTPATIENT
Start: 2020-12-11 | End: 2022-01-03

## 2020-12-11 RX ORDER — PREDNISOLONE ACETATE 10 MG/ML
1 SUSPENSION/ DROPS OPHTHALMIC
Qty: 5 ML | Refills: 6 | Status: SHIPPED | OUTPATIENT
Start: 2020-12-11

## 2020-12-11 NOTE — PROGRESS NOTES
HPI     DLS: 8/28/20    Pt here for HRT review;  Pt states the Dorzolamide eye drops really burn her eyes.    Meds;   Brimonidine bid ou  Dorzolamide bid ou  Restasis bid ou Uses once a day 2/2 cost)  AT's (PF) 2-6 x day ou  PA 1 drop 3 x week Monday, Wednesday, and Friday ou    1. Chronic Angle Closure / Phacomorphic   2. LUBNA   3. Rebound Iritis OD   4. PCIOL OU   5. Hx Bilateral Blepharoplasty     Last edited by Josefa Valladares MD on 12/11/2020  4:16 PM. (History)            Assessment /Plan     For exam results, see Encounter Report.    Chronic angle-closure glaucoma of eye, left, mild stage    Secondary iritis of both eyes    Dry eyes         1.   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   First photos   1/2020   Treatment / Drops started   11/2019            Family history    none        Glaucoma meds   lumigan ou // brimonidine od  - started 1/2020 - at ED visit for possible post op iritis         H/O adverse rxn to glaucoma drops    Intol to generic lumigan / ?? Other intolerances          LASERS    None (no PI's)         GLAUCOMA SURGERIES    Phaco/IOL for phacomorphic closure and I stents at same time (12/2019)         OTHER EYE SURGERIES    Cataract surgery and istent right eye (12/2019 - [facility])         CDR    0.5 / 0.3        Tbase    btw 26 and 40 pre cataract surgery         Tmax    40/40         Ttarget    ????             HVF    1 test 2020 to 2020 - SNS od // full os         Gonio    + 1-2 w/ 2 I tents // +1-2 with 1 I stent         CCT    523/514        OCT    1 test 2020 to 2020 - RNFL - dec. G/TI, bord N/NI/TS od // nl os         HRT   1 test 2020 to 2020 - MR - bord TIod , nl os // 0.60od / 0.50 os         Disc photos  2020    - Ttoday    14/14  - Test done today    IOP/ HRT     PT moved from [place], [place] was dx with POAG OU in Nov 2019. PT received PCIOL OU with istent injection in both eyes. Pt has istent (2) in right eye and (1)  istent in left eye. Pt has SA 60 Yasmany lenses in both eyes. PT here to establish care. (1/21/2020)     2. Dry eyes    Exacerbated by a 4 lid blepharoplasty    rec AT's 4- 8 x day     3. PC IOL OU -    12/2019 - [place]    Laser assisted  / laser relaxing incision and I stents (angle narrow)     4.  Rebound Iritis, OD, s/p CEIOL and iStent x 2 OD  - off Lumigan right eye  - cont w/  taper of Pred Forte  - decrease to q day - using ou   -cont lumigan os 1 x day - when runs out can switch to brimonidine bid os (already using od)   - Brimonidine 3 times a day right eye (only using bid)   -dorzolamide - tid ou (called in because pt had to get a steroid injection and she was worried the IOP might go up) - only using bid     5. H/O  Bilateral upper and lower blepharoplasty    Has been having more dry eye symptoms since    Suggest using AT's and if needed ointment at night           Plan   Cont brimonidine bid ou - (written cor tid)   Cont dorzolamide bid (written for tid )   restasis bid ou (Rx sent again )   Pres free AT's 2-6 x day as needed   Decrease Pred acetate to 1 drop both eyes 3 x week - Monday / Wednesday and Friday     Glasses - woody 5/18/2019     F/U 6  months with  Gonio and IOP

## 2021-01-28 ENCOUNTER — CLINICAL SUPPORT (OUTPATIENT)
Dept: URGENT CARE | Facility: CLINIC | Age: 61
End: 2021-01-28
Payer: COMMERCIAL

## 2021-01-28 VITALS — OXYGEN SATURATION: 99 % | HEART RATE: 84 BPM | RESPIRATION RATE: 19 BRPM | TEMPERATURE: 98 F

## 2021-01-28 DIAGNOSIS — Z20.822 CLOSE EXPOSURE TO COVID-19 VIRUS: Primary | ICD-10-CM

## 2021-01-28 LAB
CTP QC/QA: YES
SARS-COV-2 RDRP RESP QL NAA+PROBE: NEGATIVE

## 2021-01-28 PROCEDURE — U0002: ICD-10-PCS | Mod: QW,S$GLB,, | Performed by: NURSE PRACTITIONER

## 2021-01-28 PROCEDURE — U0002 COVID-19 LAB TEST NON-CDC: HCPCS | Mod: QW,S$GLB,, | Performed by: NURSE PRACTITIONER

## 2021-11-05 ENCOUNTER — OFFICE VISIT (OUTPATIENT)
Dept: OPHTHALMOLOGY | Facility: CLINIC | Age: 61
End: 2021-11-05
Payer: COMMERCIAL

## 2021-11-05 DIAGNOSIS — Z96.1 PSEUDOPHAKIA OF BOTH EYES: ICD-10-CM

## 2021-11-05 DIAGNOSIS — M35.01 KCS (KERATOCONJUNCTIVITIS SICCA): ICD-10-CM

## 2021-11-05 DIAGNOSIS — H40.2221 CHRONIC ANGLE-CLOSURE GLAUCOMA OF EYE, LEFT, MILD STAGE: ICD-10-CM

## 2021-11-05 DIAGNOSIS — Z98.890 S/P BILATERAL BLEPHAROPLASTY: ICD-10-CM

## 2021-11-05 DIAGNOSIS — H04.123 DRY EYES: ICD-10-CM

## 2021-11-05 DIAGNOSIS — H20.043: ICD-10-CM

## 2021-11-05 DIAGNOSIS — H40.2212 CHRONIC ANGLE-CLOSURE GLAUCOMA, RIGHT, MODERATE STAGE: Primary | ICD-10-CM

## 2021-11-05 PROCEDURE — 92012 PR EYE EXAM, EST PATIENT,INTERMED: ICD-10-PCS | Mod: S$GLB,,, | Performed by: OPHTHALMOLOGY

## 2021-11-05 PROCEDURE — 1159F MED LIST DOCD IN RCRD: CPT | Mod: CPTII,S$GLB,, | Performed by: OPHTHALMOLOGY

## 2021-11-05 PROCEDURE — 1160F RVW MEDS BY RX/DR IN RCRD: CPT | Mod: CPTII,S$GLB,, | Performed by: OPHTHALMOLOGY

## 2021-11-05 PROCEDURE — 92012 INTRM OPH EXAM EST PATIENT: CPT | Mod: S$GLB,,, | Performed by: OPHTHALMOLOGY

## 2021-11-05 PROCEDURE — 99999 PR PBB SHADOW E&M-EST. PATIENT-LVL III: CPT | Mod: PBBFAC,,, | Performed by: OPHTHALMOLOGY

## 2021-11-05 PROCEDURE — 1160F PR REVIEW ALL MEDS BY PRESCRIBER/CLIN PHARMACIST DOCUMENTED: ICD-10-PCS | Mod: CPTII,S$GLB,, | Performed by: OPHTHALMOLOGY

## 2021-11-05 PROCEDURE — 1159F PR MEDICATION LIST DOCUMENTED IN MEDICAL RECORD: ICD-10-PCS | Mod: CPTII,S$GLB,, | Performed by: OPHTHALMOLOGY

## 2021-11-05 PROCEDURE — 99999 PR PBB SHADOW E&M-EST. PATIENT-LVL III: ICD-10-PCS | Mod: PBBFAC,,, | Performed by: OPHTHALMOLOGY

## 2022-05-29 ENCOUNTER — IMMUNIZATION (OUTPATIENT)
Dept: PRIMARY CARE CLINIC | Facility: CLINIC | Age: 62
End: 2022-05-29
Payer: COMMERCIAL

## 2022-05-29 DIAGNOSIS — Z23 NEED FOR VACCINATION: Primary | ICD-10-CM

## 2022-05-29 PROCEDURE — 0064A COVID-19, MRNA, LNP-S, PF, 100 MCG/0.25 ML DOSE VACCINE (MODERNA BOOSTER): CPT | Mod: CV19,PBBFAC | Performed by: INTERNAL MEDICINE

## 2023-01-25 ENCOUNTER — TELEPHONE (OUTPATIENT)
Dept: SLEEP MEDICINE | Facility: CLINIC | Age: 63
End: 2023-01-25
Payer: COMMERCIAL

## 2023-01-25 NOTE — TELEPHONE ENCOUNTER
----- Message from Maxine Vegas sent at 1/25/2023 10:53 AM CST -----  Regarding: Patient call back  Who called:LEONELA MONTOYA [[id number]]    What is the request in detail: Patient is requesting a call back. She is a former pt of Dr. Florian and  her cpap has been recalled. She would like to know if there is anyway Dr. Delgado can sign off on her settings for her to get a new machine without being seen.   Please advise.    Can the clinic reply by [facility]? No    Best call back number: [phone]    Additional Information: N/A

## 2023-03-01 ENCOUNTER — OFFICE VISIT (OUTPATIENT)
Dept: SLEEP MEDICINE | Facility: CLINIC | Age: 63
End: 2023-03-01
Payer: COMMERCIAL

## 2023-03-01 ENCOUNTER — PATIENT MESSAGE (OUTPATIENT)
Dept: SLEEP MEDICINE | Facility: CLINIC | Age: 63
End: 2023-03-01

## 2023-03-01 DIAGNOSIS — G47.33 OSA (OBSTRUCTIVE SLEEP APNEA): Primary | ICD-10-CM

## 2023-03-01 PROCEDURE — 99204 PR OFFICE/OUTPT VISIT, NEW, LEVL IV, 45-59 MIN: ICD-10-PCS | Mod: 95,,, | Performed by: PSYCHIATRY & NEUROLOGY

## 2023-03-01 PROCEDURE — 99204 OFFICE O/P NEW MOD 45 MIN: CPT | Mod: 95,,, | Performed by: PSYCHIATRY & NEUROLOGY

## 2023-03-01 NOTE — PROGRESS NOTES
"    The patient location is: home  The chief complaint leading to consultation is: sleep disorder  Visit type: audiovisual  Each patient to whom he or she provides medical services by telemedicine is:  (1) informed of the relationship between the physician and patient and the respective role of any other health care provider with respect to management of the patient; and (2) notified that he or she may decline to receive medical services by telemedicine and may withdraw from such care at any time.  46 minutes of total time spent on the encounter, which includes face to face time and non-face to face time preparing to see the patient (eg, review of tests), Obtaining and/or reviewing separately obtained history, Documenting clinical information in the electronic or other health record, Independently interpreting results (not separately reported) and communicating results to the patient/family/caregiver, or Care coordination (not separately reported).        Dora Vazquez is a 62 y.o. female is here to be evaluated for a sleep disorder; referred by Self, Aa[facility]ferral.    She previouse Dr. Florian years back for chronic insomnia.  She used to travel overse[place] for work - every 2 weeks with big time differences.    She had an issue "shutting her brain down".  Using blue light filters on her device, using hte designated office; no electronics after 7 PM  Getting into yoga.  Tried Calm.    She has been taking Trazodone for years. Klonopin (name brand) was added later - and has been beneficial (nothing else helped her sleep)  She was also diagnosed with OSQA and her machine went on recall - now needs a nw CPAP presciption.     Using APAP 6-16 cm H2O  with Yanelis view small mask that she likes.    She misses her machine - she falt she slpt better while she had her machine    Has not used since recall.     APAP - Serial Number is [id number]  Confirmation Number: [id number]      Dora Vazquez  denies " symptoms concerning for parasomnia except for occasional somniloquy.  The patient  denies auxiliary symptoms of narcolepsy including sleep onset paralysis, hypnagogic hallucinations, sleep attacks and cataplexy.    Dora Charlotte Taylor denied symptoms concerning for RLS; nocturnal leg movements have not been noticed.   The patient does not experience sleep related leg cramps.           Medications pertinent to sleep  disorders taken currently: Trazodone 150 mg and Klonapin 0.5 mg  Previous  medications taken  for sleep disorders:  statin - stopped    Sleep studies  PSG study  In 2014showed significant JONATHAN with the AHI of 9/hour and SaO2 minimum of 90 %. AHI was 9.0 with an oxygen kevin of 90.0%. The supine AHI was 10.8 and the REM AHI was 26.2.       Occupation:still working; no longer traveling time zones every 2 weeks like she used to.   Bed partner:   Exercise routine: active          EPWORTH SLEEPINESS SCALE 3/1/2023   Sitting and reading 0   Watching TV 1   Sitting, inactive in a public place (e.g. a theatre or a meeting) 0   As a passenger in a car for an hour without a break 2   Lying down to rest in the afternoon when circumstances permit 1   Sitting and talking to someone 0   Sitting quietly after a lunch without alcohol 0   In a car, while stopped for a few minutes in traffic 0   Total score 4       EPWORTH SLEEPINESS SCALE 3/1/2023   Sitting and reading 0   Watching TV 1   Sitting, inactive in a public place (e.g. a theatre or a meeting) 0   As a passenger in a car for an hour without a break 2   Lying down to rest in the afternoon when circumstances permit 1   Sitting and talking to someone 0   Sitting quietly after a lunch without alcohol 0   In a car, while stopped for a few minutes in traffic 0   Total score 4     Sleep Clinic New Patient 3/1/2023   What time do you go to bed on a week day? (Give a range) 10 with Trazodone and Klonopin 0.5 mg   What time do you go to bed on a day off? (Give a range) 10    How long does it take you to fall asleep? (Give a range) Depends 30 minutes or more   On average, how many times per night do you wake up? 1-2   How long does it take you to fall back into sleep? (Give a range) Depends sometimes within 20 minutes sometimes not at all   What time do you wake up to start your day on a week day? (Give a range) 7   What time do you wake up to start your day on a day off? (Give a range) 7   What time do you get out of bed? (Give a range) 7   On average, how many hours do you sleep? 6-7 hours   On average, how many naps do you take per day? None   Rate your sleep quality from 0 to 5 (0-poor, 5-great). 3   Have you experienced:  N/a   How much weight have you lost or gained (in lbs.) in the last year? 15   On average, how many times per night do you go to the bathroom?  0   Have you ever had a sleep study/CPAP machine/surgery for sleep apnea? Yes   Have you ever had a CPAP machine for sleep apnea? Yes   Have you ever had surgery for sleep apnea? No       Sleep Clinic ROS  3/1/2023   Difficulty breathing through the nose?  Sometimes   Sore throat or dry mouth in the morning? Sometimes   Irregular or very fast heart beat?  Sometimes   Shortness of breath?  Sometimes   Acid reflux? Sometimes   Body aches and pains?  Sometimes   Morning headaches? No   Dizziness? No   Mood changes?  Sometimes   Do you exercise?  Yes   Do you feel like moving your legs a lot?  Sometimes       DME: [facility]        PAST MEDICAL HISTORY:    Active Ambulatory Problems     Diagnosis Date Noted    Obesity 01/25/2013    Spondylosis without myelopathy 05/29/2013    Degeneration of lumbar or lumbosacral intervertebral disc 05/29/2013    Spinal stenosis, lumbar region, with neurogenic claudication 05/29/2013    Thoracic or lumbosacral neuritis or radiculitis, unspecified 05/29/2013    Displacement of lumbar intervertebral disc without myelopathy 05/29/2013    Lumbago 05/29/2013    Sacroiliitis 05/29/2013    GERD  (gastroesophageal reflux disease) 08/02/2013    Obstructive sleep apnea (adult) (pediatric) 10/27/2014    Thrombocytopenia 08/31/2017    Uveitis 01/21/2020     Resolved Ambulatory Problems     Diagnosis Date Noted    No Resolved Ambulatory Problems     Past Medical History:   Diagnosis Date    Arthritis     COPD (chronic obstructive pulmonary disease)     Depression     Glaucoma     History of colonic polyps     Hypertension     Hypothyroidism     Insomnia     Ulcer                 PAST SURGICAL HISTORY:    Past Surgical History:   Procedure Laterality Date    BILATERAL VATS ABLATION  8/27/13     Brothers at  (benign)    CATARACT EXTRACTION W/  INTRAOCULAR LENS IMPLANT Bilateral 12/2019    DONE IN [place]    CHOLECYSTECTOMY      COSMETIC SURGERY      FRACTURE SURGERY      LAPAROSCOPIC GASTRIC BANDING  12/27/07    APS lap band    ligation thumb repair      LUNG CANCER SURGERY           FAMILY HISTORY:                Family History   Problem Relation Age of Onset    Heart disease Mother     Cancer Father     Amblyopia Neg Hx     Blindness Neg Hx     Cataracts Neg Hx     Glaucoma Neg Hx     Macular degeneration Neg Hx     Retinal detachment Neg Hx     Strabismus Neg Hx        SOCIAL HISTORY:          Tobacco:   Social History     Tobacco Use   Smoking Status Every Day    Packs/day: 1.00    Years: 30.00    Pack years: 30.00    Types: Cigarettes   Smokeless Tobacco Never       alcohol use:    Social History     Substance and Sexual Activity   Alcohol Use No                   ALLERGIES:    Review of patient's allergies indicates:   Allergen Reactions    Codeine Nausea Only       CURRENT MEDICATIONS:    Current Outpatient Medications   Medication Sig Dispense Refill    albuterol 90 mcg/actuation inhaler Inhale 2 puffs into the lungs every 4 (four) hours as needed for Wheezing. Rescue 1 Inhaler 0    amLODIPine (NORVASC) 2.5 MG tablet Take 1 tablet (2.5 mg total) by mouth once daily. for 21 days 21 tablet 0     amoxicillin-clavulanate 875-125mg (AUGMENTIN) 875-125 mg per tablet Take 1 tablet by mouth 2 (two) times daily. 14 tablet 0    brimonidine 0.2% (ALPHAGAN) 0.2 % Drop Place 1 drop into both eyes 3 (three) times daily. 30 mL 3    clonazePAM (KLONOPIN) 0.5 MG tablet Take 0.5 mg by mouth 2 (two) times daily as needed for Anxiety.      cyanocobalamin (VITAMIN B-12) 1000 MCG tablet Take 100 mcg by mouth once daily.      cycloSPORINE (RESTASIS) 0.05 % ophthalmic emulsion Place 1 drop into both eyes 2 (two) times daily. (Patient not taking: Reported on 12/11/2021) 60 each 12    dorzolamide (TRUSOPT) 2 % ophthalmic solution INSTILL 1 DROP INTO BOTH EYES 3 TIMES A DAY 30 mL 2    fluticasone propion/salmeterol (ADVAIR DISKUS INHL) Inhale 1 puff into the lungs once daily.      ibuprofen (ADVIL,MOTRIN) 800 MG tablet Take 800 mg by mouth 3 (three) times daily.      ondansetron (ZOFRAN) 4 MG tablet Take 4 mg by mouth.      prednisoLONE acetate (PRED FORTE) 1 % DrpS Place 1 drop into both eyes 3 (three) times a week. Use one drop in each eye every Monday, Wednesday and friday 5 mL 6    traZODone (DESYREL) 150 MG tablet TAKE 1 TABLET (150 MG TOTAL) BY MOUTH NIGHTLY. (Patient taking differently: 75 mg. TAKE 1 TABLET (150 MG TOTAL) BY MOUTH NIGHTLY.) 30 tablet 2    vitamin D 185 MG Tab Take 185 mg by mouth once daily.       No current facility-administered medications for this visit.                      PHYSICAL EXAM:  There were no vitals taken for this visit.            ASSESSMENT:    1. Complex Sleep Apnea. Has been using her machine compliantly until the recall; [facility] is requesting a new prescription. The patient was explained that she is also eligible to get a new machine under insurance coverage.  She would like to exercise both routes.          PLAN:    Will order APAP 6-16 cm H2O      Will fax rx to [facility] and send a copy to pt via the portal    [employer] APAP - Serial Number is [id number]  Confirmation Number:  [id number]    During our discussion today, we talked about the etiology of  JONATHAN as well as the potential ramifications of untreated sleep apnea, which could include daytime sleepiness, hypertension, heart disease and/or stroke.  We discussed potential treatment options, which could include weight loss, body positioning, continuous positive airway pressure (CPAP), or referral for surgical consideration. Meanwhile, she  is urged to avoid supine sleep, weight gain and alcoholic beverages since all of these can worsen JONATHAN.     The patient was given open opportunity to ask questions and/or express concerns about treatment plan. Two point patient identifier confirmed.       Precautions: The patient was advised to abstain from driving should he feel sleepy or drowsy.    Follow up: MD after the sleep study has been completed.     46-minute visit. >50% spent counseling patient and coordination of care.  The patient was  cautioned against drowsy driving.

## 2023-03-01 NOTE — Clinical Note
"AB, I have put 2 versions of CPAP prescription in [facility] today - and both went to the printer, I believe. Please find the one that says "neli" on it and fax it to [facility] fax [phone].   Thank you!   LB"

## 2023-03-01 NOTE — PATIENT INSTRUCTIONS
l fax your CPAP prescription to [facility] and  a copy to you will be send separately      Will order one machine through insurance      I'm ordering  the machine for you through [facility]:  [phone]  They will call you within several weeks or you can call them.  ________________________________    Aftter you get your machine:    1. Please keep in mind, that when you come to  the machine, you will be choosing the CPAP mask during that time. Please call [phone] within 30 days if you uncomfortable with your mask (you need to do it within 30 days from getting the mask)    2. Please let me know through [facility] if you are not comfortable with the pressure settings - I can help.    Please make a follow up appointment via My chart or by calling us at [phone] once you get your machine. It is recommended to come for CPAP follow up  within 31-90 days fromt he time you get your new machine. The purpose of CPAP follow up is to ensure that your sleep apnea is well corrected and to address any possible concerns.  My appointments usually run 2 months ahead, so please schedule in advance.     PLEASE BRING YOUR MACHINE (with all supplies) WHEN YOU COME SEE ME FOR CPAP FOLLOW UP!          ________________________________________________________    Sleep Hygiene Practices     1. Try going to bed only when you are drowsy.  ?   2. If you are unable to fall asleep or stay asleep, leave your bedroom and engage in a quiet activity elsewhere. Do not permit yourself to fall asleep outside the bedroom. Return to bed when and only when you are sleepy. Repeat this process as often as necessary throughout night.   3. Maintain regular wake-up time, even on days off work & weekends   4. Use your bedroom for sleep and sex   5. Do not watch TV in bed  6. Avoid napping during the daytime. If daytime sleepiness becomes overwhelming, limit nap time to a single nap of less than 1hr, no later than 3pm.   7. Distract  your mind. Avoid clock watching. Lying in bed unable to sleep and frustrated needs to be avoided. Try reading or watching a videotape or listening to books on tape. It may be necessary to go into another room to do these.   8. Avoid caffeine within 4-6hrs of bedtime   9. Avoid use of nicotine close to bedtime   10. do not drink alcoholic beverages within 4-6hrs of bedtime   11. While a light snack before bedtime can help promote sound sleep, avoid large meals.   12. Obtain regular exercise, but avoid strenuous exercise within 4hrs of bedtime   13. Minimize light, noise, and extremes in temperature in the bedroom.   14. Precautions: The patient was advised to abstain from driving should they feel sleepy or drowsy.    [employer] Timer - free jessica  [place]
